# Patient Record
Sex: FEMALE | Race: WHITE | Employment: FULL TIME | URBAN - METROPOLITAN AREA
[De-identification: names, ages, dates, MRNs, and addresses within clinical notes are randomized per-mention and may not be internally consistent; named-entity substitution may affect disease eponyms.]

---

## 2017-12-28 ENCOUNTER — OFFICE VISIT (OUTPATIENT)
Dept: URGENT CARE | Facility: RETAIL CLINIC | Age: 32
End: 2017-12-28
Payer: COMMERCIAL

## 2017-12-28 VITALS — TEMPERATURE: 97.8 F | SYSTOLIC BLOOD PRESSURE: 128 MMHG | DIASTOLIC BLOOD PRESSURE: 73 MMHG | HEART RATE: 80 BPM

## 2017-12-28 DIAGNOSIS — R05.9 COUGH: ICD-10-CM

## 2017-12-28 DIAGNOSIS — K52.9 GASTROENTERITIS: ICD-10-CM

## 2017-12-28 DIAGNOSIS — H10.9 BACTERIAL CONJUNCTIVITIS: Primary | ICD-10-CM

## 2017-12-28 LAB
FLUAV AG UPPER RESP QL IA.RAPID: NORMAL
FLUBV AG UPPER RESP QL IA.RAPID: NORMAL

## 2017-12-28 PROCEDURE — 99213 OFFICE O/P EST LOW 20 MIN: CPT | Performed by: PHYSICIAN ASSISTANT

## 2017-12-28 PROCEDURE — 87804 INFLUENZA ASSAY W/OPTIC: CPT | Mod: 59 | Performed by: PHYSICIAN ASSISTANT

## 2017-12-28 RX ORDER — POLYMYXIN B SULFATE AND TRIMETHOPRIM 1; 10000 MG/ML; [USP'U]/ML
1 SOLUTION OPHTHALMIC
Qty: 1 BOTTLE | Refills: 0 | Status: SHIPPED | OUTPATIENT
Start: 2017-12-28 | End: 2018-01-04

## 2017-12-28 NOTE — NURSING NOTE
"Chief Complaint   Patient presents with     Cough     x 2 days      Vomiting       Initial /73  Pulse 80  Temp 97.8  F (36.6  C) (Oral) Estimated body mass index is 29.23 kg/(m^2) as calculated from the following:    Height as of 7/25/16: 5' 2\" (1.575 m).    Weight as of 12/28/16: 159 lb 12.8 oz (72.5 kg).  Medication Reconciliation: complete   Ashely Donahue      "

## 2017-12-28 NOTE — PROGRESS NOTES
SUBJECTIVE:   Mandy Montgomery is a 32 year old female who presents to clinic today for the following health issues:    Gastrointestinal symptoms      Duration: 2 days    Description:           Nausea with one episode of vomiting, concerned about influenza    Intensity:  mild    Accompanying signs and symptoms:  nausea, vomitting and denies diarrhea    History  Previous {similar problem: no   Previous evaluation:  none    Aggravating factors: none    Alleviating factors: nothing    Other Therapies tried: None        Eye(s) Problem      Duration: 1 day    Description:  Location: right  Pain: no   Redness: YES  Discharge: YES    Accompanying signs and symptoms: No other cold symptoms    History (Trauma, foreign body exposure,): None    Precipitating or alleviating factors (contact use): None    Therapies tried and outcome: None        Problem list and histories reviewed & adjusted, as indicated.  Additional history: as documented    Patient Active Problem List   Diagnosis     Mild major depression (H)     CARDIOVASCULAR SCREENING; LDL GOAL LESS THAN 160     GERD (gastroesophageal reflux disease)     Anxiety     Generalized anxiety disorder     Family history of colon cancer     Other normal pregnancy, not first, third trimester     Post-dates pregnancy     Vaginal delivery     Past Surgical History:   Procedure Laterality Date     COLONOSCOPY  1/09    REPEAT AGE 31 AND EVERY 5 YEARS     CYSTOSCOPY N/A 12/29/2016    Procedure: CYSTOSCOPY;  Surgeon: Eder Evans MD;  [facility]     LAPAROSCOPIC TUBAL LIGATION Bilateral 12/29/2016    Procedure: LAPAROSCOPIC TUBAL LIGATION;  Surgeon: Kamaljit Gil MD;  Location: [facility]       Social History   Substance Use Topics     Smoking status: Never Smoker     Smokeless tobacco: Never Used     Alcohol use 0.6 oz/week     1 Standard drinks or equivalent per week     Family History   Problem Relation Age of Onset     CEREBROVASCULAR DISEASE Father      BRAIN  ANEURYSM     Hypertension Maternal Grandmother      Hypertension Maternal Grandfather      Genitourinary Problems Father      polycystic kidney disease     Cancer - colorectal Mother 45         Current Outpatient Prescriptions   Medication Sig Dispense Refill     trimethoprim-polymyxin b (POLYTRIM) ophthalmic solution Apply 1 drop to eye every 3 hours for 7 days 1 Bottle 0     Prenatal Vit-Fe Fumarate-FA (PRENATAL MULTIVITAMIN  PLUS IRON) 27-0.8 MG TABS Take 1 tablet by mouth daily 100 tablet 3     HYDROcodone-acetaminophen (NORCO) 5-325 MG per tablet Take 1-2 tablets by mouth every 4 hours as needed for moderate to severe pain maximum 4 tablet(s) per day (Patient not taking: Reported on 12/28/2017) 20 tablet 0     ibuprofen (ADVIL,MOTRIN) 400 MG tablet Take 2 tablets (800 mg) by mouth every 6 hours as needed for other (cramping) (Patient not taking: Reported on 12/28/2017) 100 tablet 0     Allergies   Allergen Reactions     Azithromycin Rash     PT NOT SURE     BP Readings from Last 3 Encounters:   12/28/17 128/73   12/29/16 120/73   12/28/16 112/66    Wt Readings from Last 3 Encounters:   12/28/16 159 lb 12.8 oz (72.5 kg)   11/14/16 172 lb 12.8 oz (78.4 kg)   11/09/16 171 lb (77.6 kg)                        Reviewed and updated as needed this visit by clinical staffTobacco  Allergies  Meds       Reviewed and updated as needed this visit by Provider         ROS:  Constitutional, HEENT, cardiovascular, pulmonary, gi and gu systems are negative, except as otherwise noted.      OBJECTIVE:   /73  Pulse 80  Temp 97.8  F (36.6  C) (Oral)  There is no height or weight on file to calculate BMI.  GENERAL: healthy, alert and no distress  EYES: conjunctiva/corneas- conjunctival injection of right eye with white drainage  HENT: ear canals and TM's normal, nose and mouth without ulcers or lesions  NECK: no adenopathy, no asymmetry, masses, or scars and thyroid normal to palpation  RESP: lungs clear to auscultation -  no rales, rhonchi or wheezes  CV: regular rate and rhythm, normal S1 S2, no S3 or S4, no murmur, click or rub, no peripheral edema and peripheral pulses strong  ABDOMEN: soft, nontender, no hepatosplenomegaly, no masses and bowel sounds normal  MS: no gross musculoskeletal defects noted, no edema    Diagnostic Test Results:  Influenza neg    ASSESSMENT/PLAN:       ICD-10-CM    1. Bacterial conjunctivitis H10.9 trimethoprim-polymyxin b (POLYTRIM) ophthalmic solution   2. Cough R05 INFLUENZA A/B ANTIGEN   3. Gastroenteritis K52.9        As needed if not improving.  The patient indicates understanding of these issues and agrees with the plan.      Damaris Horn PA-C  [facility]

## 2017-12-28 NOTE — MR AVS SNAPSHOT
After Visit Summary   12/28/2017    Mandy Montgomery    MRN: [id number]           Patient Information     Date Of Birth          1985        Visit Information        Provider Department      12/28/2017 1:50 PM Damaris Horn PA-C [facility]        Today's Diagnoses     Cough    -  1    Bacterial conjunctivitis          Care Instructions      Conjunctivitis Caused by Infection     Wash hands often to help prevent spreading infection.     Infections are caused by viruses or germs (bacteria). Treatment includes keeping your eyes and hands clean. Your healthcare provider may prescribe eye drops, and tell you to stay home from work or school if you re contagious. Untreated infections can be serious. It's important to see your provider for a diagnosis.  Viral infections  A cold, flu, or other virus can spread to your eyes. This causes a watery discharge. Your eyes may burn or itch and get red. Your eyelids may also be puffy and sore.  Treatment  Most viral infections go away on their own. Artificial tears and warm compresses can relieve symptoms. Your provider may also prescribe eye drops. A viral infection can be very contagious and spreads quickly. To prevent this, wash your hands often. Use a separate tissue to wipe each eye. Don t touch your eyes or share bedding or towels.   Bacterial infections  Bacterial infections often occur in one eye. There may be a watery or a thick discharge from the eye. These infections can cause serious damage to your eye if not treated promptly.  Treatment  Your provider may prescribe eye drops or ointment to kill the bacteria. Warm compresses can help keep the eyelids clean. To keep the bacteria from spreading, wash your hands often. Use a separate tissue to wipe each eye. Don t touch your eyes or share bedding or towels.  Date Last Reviewed: 6/11/2015 2000-2017 [facility]. [address] [place] [address], [place] [address]. All  rights reserved. This information is not intended as a substitute for professional medical care. Always follow your healthcare professional's instructions.                Follow-ups after your visit        Who to contact     You can reach your care team any time of the day by calling [phone].  Notification of test results:  If you have an abnormal lab result, we will notify you by phone as soon as possible.         Additional Information About Your Visit        MyChart Information     [employer] gives you secure access to your electronic health record. If you see a primary care provider, you can also send messages to your care team and make appointments. If you have questions, please call your primary care clinic.  If you do not have a primary care provider, please call [phone] and they will assist you.        Care EveryWhere ID     This is your Care EveryWhere ID. This could be used by other organizations to access your [place] medical records  [id number]        Your Vitals Were     Pulse Temperature                80 97.8  F (36.6  C) (Oral)           Blood Pressure from Last 3 Encounters:   12/28/17 128/73   12/29/16 120/73   12/28/16 112/66    Weight from Last 3 Encounters:   12/28/16 159 lb 12.8 oz (72.5 kg)   11/14/16 172 lb 12.8 oz (78.4 kg)   11/09/16 171 lb (77.6 kg)              We Performed the Following     INFLUENZA A/B ANTIGEN          Today's Medication Changes          These changes are accurate as of: 12/28/17  2:07 PM.  If you have any questions, ask your nurse or doctor.               Start taking these medicines.        Dose/Directions    trimethoprim-polymyxin b ophthalmic solution   Commonly known as:  POLYTRIM   Used for:  Bacterial conjunctivitis   Started by:  Daamris Horn PA-C        Dose:  1 drop   Apply 1 drop to eye every 3 hours for 7 days   Quantity:  1 Bottle   Refills:  0            Where to get your medicines      These medications were sent to [facility]  [address] - [place], [place] - [address]  [address], [facility] [address]     Phone:  [phone]     trimethoprim-polymyxin b ophthalmic solution                Primary Care Provider Office Phone # Fax #    Kamaljit Gil [phone] [address] [facility]   [facility] [place] [address]        Equal Access to Services     [facility]: Hadii aad ku hadasho Soomaali, waaxda luqadaha, qaybta kaalmada adeegyada, waxay idiin hayaan adeanderson khjayyriley lakathy . So [facility] [phone].    ATENCIÓN: Si habla español, tiene a chamberlain disposición servicios gratuitos de asistencia lingüística. Daxame al [phone].    We comply with applicable federal civil rights laws and [place] laws. We do not discriminate on the basis of race, color, national origin, age, disability, sex, sexual orientation, or gender identity.            Thank you!     Thank you for choosing [facility]  for your care. Our goal is always to provide you with excellent care. Hearing back from our patients is one way we can continue to improve our services. Please take a few minutes to complete the written survey that you may receive in the mail after your visit with us. Thank you!             Your Updated Medication List - Protect others around you: Learn how to safely use, store and throw away your medicines at www.disposemymeds.org.          This list is accurate as of: 12/28/17  2:07 PM.  Always use your most recent med list.                   Brand Name Dispense Instructions for use Diagnosis    HYDROcodone-acetaminophen 5-325 MG per tablet    NORCO    20 tablet    Take 1-2 tablets by mouth every 4 hours as needed for moderate to severe pain maximum 4 tablet(s) per day    S/P tubal ligation       ibuprofen 400 MG tablet    ADVIL/MOTRIN    100 tablet    Take 2 tablets (800 mg) by mouth every 6 hours as needed for other (cramping)    Vaginal delivery       prenatal multivitamin plus iron 27-0.8 MG Tabs per tablet     100 tablet     Take 1 tablet by mouth daily    Need for vaccination, Other normal pregnancy, not first, second trimester       trimethoprim-polymyxin b ophthalmic solution    POLYTRIM    1 Bottle    Apply 1 drop to eye every 3 hours for 7 days    Bacterial conjunctivitis

## 2017-12-28 NOTE — PATIENT INSTRUCTIONS
Conjunctivitis Caused by Infection     Wash hands often to help prevent spreading infection.     Infections are caused by viruses or germs (bacteria). Treatment includes keeping your eyes and hands clean. Your healthcare provider may prescribe eye drops, and tell you to stay home from work or school if you re contagious. Untreated infections can be serious. It's important to see your provider for a diagnosis.  Viral infections  A cold, flu, or other virus can spread to your eyes. This causes a watery discharge. Your eyes may burn or itch and get red. Your eyelids may also be puffy and sore.  Treatment  Most viral infections go away on their own. Artificial tears and warm compresses can relieve symptoms. Your provider may also prescribe eye drops. A viral infection can be very contagious and spreads quickly. To prevent this, wash your hands often. Use a separate tissue to wipe each eye. Don t touch your eyes or share bedding or towels.   Bacterial infections  Bacterial infections often occur in one eye. There may be a watery or a thick discharge from the eye. These infections can cause serious damage to your eye if not treated promptly.  Treatment  Your provider may prescribe eye drops or ointment to kill the bacteria. Warm compresses can help keep the eyelids clean. To keep the bacteria from spreading, wash your hands often. Use a separate tissue to wipe each eye. Don t touch your eyes or share bedding or towels.  Date Last Reviewed: 6/11/2015 2000-2017 [employer]. [address] [place] [address], [place] [address]. All rights reserved. This information is not intended as a substitute for professional medical care. Always follow your healthcare professional's instructions.

## 2018-02-19 ENCOUNTER — OFFICE VISIT (OUTPATIENT)
Dept: FAMILY MEDICINE | Facility: CLINIC | Age: 33
End: 2018-02-19
Payer: OTHER MISCELLANEOUS

## 2018-02-19 VITALS
HEART RATE: 72 BPM | HEIGHT: 64 IN | WEIGHT: 148 LBS | OXYGEN SATURATION: 100 % | BODY MASS INDEX: 25.27 KG/M2 | DIASTOLIC BLOOD PRESSURE: 70 MMHG | TEMPERATURE: 98.7 F | SYSTOLIC BLOOD PRESSURE: 112 MMHG

## 2018-02-19 DIAGNOSIS — Z80.0 FAMILY HISTORY OF COLON CANCER: ICD-10-CM

## 2018-02-19 DIAGNOSIS — R10.31 RLQ ABDOMINAL PAIN: Primary | ICD-10-CM

## 2018-02-19 DIAGNOSIS — S39.012A STRAIN OF MUSCLE, FASCIA AND TENDON OF LOWER BACK, INITIAL ENCOUNTER: ICD-10-CM

## 2018-02-19 DIAGNOSIS — Z12.11 SPECIAL SCREENING FOR MALIGNANT NEOPLASMS, COLON: ICD-10-CM

## 2018-02-19 LAB
ALBUMIN UR-MCNC: NEGATIVE MG/DL
APPEARANCE UR: CLEAR
BILIRUB UR QL STRIP: NEGATIVE
COLOR UR AUTO: NORMAL
GLUCOSE UR STRIP-MCNC: NEGATIVE MG/DL
HGB UR QL STRIP: NEGATIVE
KETONES UR STRIP-MCNC: NEGATIVE MG/DL
LEUKOCYTE ESTERASE UR QL STRIP: NEGATIVE
NITRATE UR QL: NEGATIVE
PH UR STRIP: 7 PH (ref 5–7)
SOURCE: NORMAL
SP GR UR STRIP: 1.01 (ref 1–1.03)
UROBILINOGEN UR STRIP-MCNC: 0 MG/DL (ref 0–2)

## 2018-02-19 PROCEDURE — 99213 OFFICE O/P EST LOW 20 MIN: CPT | Performed by: FAMILY MEDICINE

## 2018-02-19 PROCEDURE — 81003 URINALYSIS AUTO W/O SCOPE: CPT | Performed by: FAMILY MEDICINE

## 2018-02-19 ASSESSMENT — ANXIETY QUESTIONNAIRES
GAD7 TOTAL SCORE: 19
6. BECOMING EASILY ANNOYED OR IRRITABLE: NEARLY EVERY DAY
2. NOT BEING ABLE TO STOP OR CONTROL WORRYING: NEARLY EVERY DAY
3. WORRYING TOO MUCH ABOUT DIFFERENT THINGS: NEARLY EVERY DAY
1. FEELING NERVOUS, ANXIOUS, OR ON EDGE: NEARLY EVERY DAY
IF YOU CHECKED OFF ANY PROBLEMS ON THIS QUESTIONNAIRE, HOW DIFFICULT HAVE THESE PROBLEMS MADE IT FOR YOU TO DO YOUR WORK, TAKE CARE OF THINGS AT HOME, OR GET ALONG WITH OTHER PEOPLE: VERY DIFFICULT
5. BEING SO RESTLESS THAT IT IS HARD TO SIT STILL: NEARLY EVERY DAY
7. FEELING AFRAID AS IF SOMETHING AWFUL MIGHT HAPPEN: NEARLY EVERY DAY

## 2018-02-19 ASSESSMENT — PATIENT HEALTH QUESTIONNAIRE - PHQ9: 5. POOR APPETITE OR OVEREATING: SEVERAL DAYS

## 2018-02-19 ASSESSMENT — PAIN SCALES - GENERAL: PAINLEVEL: SEVERE PAIN (6)

## 2018-02-19 NOTE — MR AVS SNAPSHOT
After Visit Summary   2/19/2018    Mandy Montgomery    MRN: [id number]           Patient Information     Date Of Birth          1985        Visit Information        Provider Department      2/19/2018 12:20 PM Basilio Reardon MD [facility]        Today's Diagnoses     RLQ abdominal pain    -  1    Strain of muscle, fascia and tendon of lower back, initial encounter        Family history of colon cancer        Special screening for malignant neoplasms, colon           Follow-ups after your visit        Additional Services     GASTROENTEROLOGY ADULT REF PROCEDURE ONLY [facility] [phone]; [facility] GI Provider       Last Lab Result: Creatinine (mg/dL)       Date                     Value                 10/11/2013               0.82             ----------  Body mass index is 25.81 kg/(m^2).     Needed:  No  Language:  English    Patient will be contacted to schedule procedure.     Please be aware that coverage of these services is subject to the terms and limitations of your health insurance plan.  Call member services at your health plan with any benefit or coverage questions.  Any procedures must be performed at a [place] facility OR coordinated by your clinic's referral office.    Please bring the following with you to your appointment:    (1) Any X-Rays, CTs or MRIs which have been performed.  Contact the facility where they were done to arrange for  prior to your scheduled appointment.    (2) List of current medications   (3) This referral request   (4) Any documents/labs given to you for this referral                  Your next 10 appointments already scheduled     Mar 13, 2018 10:40 AM CDT   PHYSICAL with Kamaljit Gil MD   [facility]    [address] [place] [id number] [phone]              Who to contact     If you have questions or need follow up information about today's  "clinic visit or your schedule please contact [facility] directly at [phone].  Normal or non-critical lab and imaging results will be communicated to you by MyChart, letter or phone within 4 business days after the clinic has received the results. If you do not hear from us within 7 days, please contact the clinic through [employer] or phone. If you have a critical or abnormal lab result, we will notify you by phone as soon as possible.  Submit refill requests through [employer] or call your pharmacy and they will forward the refill request to us. Please allow 3 business days for your refill to be completed.          Additional Information About Your Visit        [employer] Information     [employer] gives you secure access to your electronic health record. If you see a primary care provider, you can also send messages to your care team and make appointments. If you have questions, please call your primary care clinic.  If you do not have a primary care provider, please call [phone] and they will assist you.        Care EveryWhere ID     This is your Care EveryWhere ID. This could be used by other organizations to access your [place] medical records  [id number]        Your Vitals Were     Pulse Temperature Height Pulse Oximetry BMI (Body Mass Index)       72 98.7  F (37.1  C) (Tympanic) 5' 3.5\" (1.613 m) 100% 25.81 kg/m2        Blood Pressure from Last 3 Encounters:   02/19/18 112/70   12/28/17 128/73   12/29/16 120/73    Weight from Last 3 Encounters:   02/19/18 148 lb (67.1 kg)   12/28/16 159 lb 12.8 oz (72.5 kg)   11/14/16 172 lb 12.8 oz (78.4 kg)              We Performed the Following     *UA reflex to Microscopic and Culture ([place]; [facility]; [facility]; [facility]; [facility]; [facility]; [place]; [place])     GASTROENTEROLOGY ADULT REF PROCEDURE ONLY [facility] [phone]; [facility] GI Provider        Primary Care Provider Office Phone # Fax #    Kamaljit RANGEL " MD [place] [phone]       [address] [place] [address]        Equal Access to Services     CONNIE LAM : Hadii aad ku haddaltondara Chavez, melodyserge jollykattyha, solismatias dwyertomerserge steelemerylserge, reece pabloin hayaacielo easonanderson lazo laVirajdavid julio. So [facility] [phone].    ATENCIÓN: Si habla español, tiene a chamberlain disposición servicios gratuitos de asistencia lingüística. Llame al [phone].    We comply with applicable federal civil rights laws and [place] laws. We do not discriminate on the basis of race, color, national origin, age, disability, sex, sexual orientation, or gender identity.            Thank you!     Thank you for choosing [facility]  for your care. Our goal is always to provide you with excellent care. Hearing back from our patients is one way we can continue to improve our services. Please take a few minutes to complete the written survey that you may receive in the mail after your visit with us. Thank you!             Your Updated Medication List - Protect others around you: Learn how to safely use, store and throw away your medicines at www.disposemymeds.org.      Notice  As of 2/19/2018  4:18 PM    You have not been prescribed any medications.

## 2018-02-19 NOTE — PROGRESS NOTES
This patient was a no show for this scheduled appointment.      SUBJECTIVE:   Mandy Montgomery is a 32 year old female who presents to clinic today for the following health issues:      ABDOMINAL PAIN     Onset: 5 days now    Description:   Character: Sharp and Stabbing  Location: right lower quadrant  Radiation: Back    Intensity: moderate    Progression of Symptoms:  same    Accompanying Signs & Symptoms:  Fever/Chills?: no   Gas/Bloating: yes  Nausea: no   Vomitting: no   Diarrhea?: no   Constipation:no   Dysuria or Hematuria: no    History:   Trauma: no   Previous similar pain: no    Previous tests done: none    Precipitating factors:   Does the pain change with:     Food: no      BM: no     Urination: YES    Alleviating factors:    Therapies Tried and outcome:     LMP:  2018       Problem list and histories reviewed & adjusted, as indicated.  Additional history: her mother  of colon cancer at age 45.  She has not had any screening to date.      Patient Active Problem List   Diagnosis     Mild major depression (H)     CARDIOVASCULAR SCREENING; LDL GOAL LESS THAN 160     GERD (gastroesophageal reflux disease)     Family history of colon cancer     Past Surgical History:   Procedure Laterality Date     COLONOSCOPY      REPEAT AGE 31 AND EVERY 5 YEARS     CYSTOSCOPY N/A 2016    Procedure: CYSTOSCOPY;  Surgeon: Eder Evans MD;  [facility]     LAPAROSCOPIC TUBAL LIGATION Bilateral 2016    Procedure: LAPAROSCOPIC TUBAL LIGATION;  Surgeon: Kamaljit Gil MD;  Location: [facility]       Social History   Substance Use Topics     Smoking status: Never Smoker     Smokeless tobacco: Never Used     Alcohol use 4.2 oz/week     7 Glasses of wine per week     Family History   Problem Relation Age of Onset     CEREBROVASCULAR DISEASE Father      BRAIN ANEURYSM in his early 30s     Genitourinary Problems Father      polycystic kidney disease     Other - See Comments Father 51       "in a motorcycle accident     Hypertension Maternal Grandmother      Hypertension Maternal Grandfather      Cancer - colorectal Mother 45     Other - See Comments Sister      older     Post-Traumatic Stress Disorder (PTSD) Sister      Other - See Comments Brother 20      in an MVA         Allergies   Allergen Reactions     Azithromycin Rash     PT NOT SURE     BP Readings from Last 3 Encounters:   18 124/66   18 112/70   17 128/73    Wt Readings from Last 3 Encounters:   18 146 lb 2 oz (66.3 kg)   18 148 lb (67.1 kg)   16 159 lb 12.8 oz (72.5 kg)                    Reviewed and updated as needed this visit by clinical staff  Tobacco  Allergies  Meds       Reviewed and updated as needed this visit by Provider         ROS:  Constitutional, HEENT, cardiovascular, pulmonary, gi and gu systems are negative, except as otherwise noted.    OBJECTIVE:     /70  Pulse 72  Temp 98.7  F (37.1  C) (Tympanic)  Ht 5' 3.5\" (1.613 m)  Wt 148 lb (67.1 kg)  SpO2 100%  BMI 25.81 kg/m2  Body mass index is 25.81 kg/(m^2).  GENERAL: healthy, alert and no distress  RESP: lungs clear to auscultation - no rales, rhonchi or wheezes  CV: regular rate and rhythm, normal S1 S2, no S3 or S4, no murmur, click or rub, no peripheral edema and peripheral pulses strong  ABDOMEN: soft, nontender except for some minimal tenderness in the suprapubic region just right of midline, no hepatosplenomegaly, no masses and bowel sounds normal  MS: no gross musculoskeletal defects noted, no edema  NEURO: Normal strength and tone, mentation intact and speech normal  BACK:  She has some mild tenderness to palpation over the paraspinal musculature in the low back region.  No muscle spasms noted.      Diagnostic Test Results:  UA was negative.     ASSESSMENT/PLAN:   (R10.31) RLQ abdominal pain  (primary encounter diagnosis)  Comment: no significant findings on exam and her urine was negative.   Plan: *UA reflex to " Microscopic and Culture         ([place]; [facility]; [facility];         [facility]; [facility]; [facility] [place];         [place]; [place])        Watch and wait.     (S39.012A) Strain of muscle, fascia and tendon of lower back, initial encounter  Comment: her back seems to be the bigger issue today.   Plan: reassured her that this is most likely soft tissue in nature.  Stretching and anti-inflammatories can be beneficial.     (Z80.0) Family history of colon cancer  (Z12.11) Special screening for malignant neoplasms, colon  Comment: her mother was diagnosed in her early 40s and  at age 45 from colon cancer.   Plan: GASTROENTEROLOGY ADULT REF PROCEDURE ONLY         [facility] [phone]; [facility] GI        Provider        She needs a screening colonoscopy now so will make that referral.       Electronically signed by:  Basilio Reardon M.D.  2018

## 2018-02-19 NOTE — NURSING NOTE
"Chief Complaint   Patient presents with     Abdominal Pain     RLQ Pain for about 5 days now       Initial Pulse 72  Temp 98.7  F (37.1  C) (Tympanic)  Ht 5' 3.5\" (1.613 m)  Wt 148 lb (67.1 kg)  SpO2 100%  BMI 25.81 kg/m2 Estimated body mass index is 25.81 kg/(m^2) as calculated from the following:    Height as of this encounter: 5' 3.5\" (1.613 m).    Weight as of this encounter: 148 lb (67.1 kg).  Medication Reconciliation: complete    "

## 2018-02-20 ENCOUNTER — TELEPHONE (OUTPATIENT)
Dept: FAMILY MEDICINE | Facility: CLINIC | Age: 33
End: 2018-02-20

## 2018-02-20 ASSESSMENT — PATIENT HEALTH QUESTIONNAIRE - PHQ9: SUM OF ALL RESPONSES TO PHQ QUESTIONS 1-9: 12

## 2018-02-20 ASSESSMENT — ANXIETY QUESTIONNAIRES: GAD7 TOTAL SCORE: 19

## 2018-02-20 NOTE — LETTER
Dear Mandy,    At [facility] we care about your health and are committed to providing quality patient care, which includes staying current on preventive cancer screenings.  You can increase your chances of finding and treating cancers through regular screenings.     Our records indicate you may be due for the following preventive screening(s):    Colonoscopy    Colonoscopy is recommended every ten years for everyone age 50 and older. We strongly urge our patient's to consider having a colonoscopy done, which is the best screening test available and only needs to be done every 10 years if results are normal. If you are unwilling or unable to have a colonoscopy then we recommend the annual stool testing for blood. This test is called a FIT test and it looks for blood in the stool.       To schedule your colonoscopy, you may contact us by phone at [phone]    If you have had any of the screenings listed above at another facility, please call us so that we may update your chart.          Your [facility] Team

## 2018-02-20 NOTE — TELEPHONE ENCOUNTER
Left message for patient to return call to schedule colonoscopy or EGD. If Selene or Marilu are unavailable, please transfer to the surgery center.     OK to schedule with Gail

## 2018-02-20 NOTE — LETTER
[address] [address]  [phone]        February 28, 2018    Mandy Montgomery  [address]  [place] [address]

## 2018-02-26 NOTE — TELEPHONE ENCOUNTER
Left message for patient to return call to schedule EGD/colonoscopy. If Marilu or Selene are not available, please transfer to same day surgery

## 2018-03-13 ENCOUNTER — OFFICE VISIT (OUTPATIENT)
Dept: BEHAVIORAL HEALTH | Facility: CLINIC | Age: 33
End: 2018-03-13
Payer: COMMERCIAL

## 2018-03-13 ENCOUNTER — OFFICE VISIT (OUTPATIENT)
Dept: FAMILY MEDICINE | Facility: CLINIC | Age: 33
End: 2018-03-13
Payer: COMMERCIAL

## 2018-03-13 VITALS
HEART RATE: 79 BPM | TEMPERATURE: 97 F | SYSTOLIC BLOOD PRESSURE: 124 MMHG | OXYGEN SATURATION: 99 % | RESPIRATION RATE: 12 BRPM | HEIGHT: 63 IN | BODY MASS INDEX: 25.89 KG/M2 | WEIGHT: 146.13 LBS | DIASTOLIC BLOOD PRESSURE: 66 MMHG

## 2018-03-13 DIAGNOSIS — F32.0 MILD MAJOR DEPRESSION (H): ICD-10-CM

## 2018-03-13 DIAGNOSIS — F41.9 ANXIETY: Primary | ICD-10-CM

## 2018-03-13 DIAGNOSIS — Z00.00 ROUTINE GENERAL MEDICAL EXAMINATION AT A HEALTH CARE FACILITY: Primary | ICD-10-CM

## 2018-03-13 PROCEDURE — 99395 PREV VISIT EST AGE 18-39: CPT | Performed by: FAMILY MEDICINE

## 2018-03-13 PROCEDURE — 99207 ZZC NO CHARGE BEHAVIORAL WARM HANDOFF: CPT | Performed by: MARRIAGE & FAMILY THERAPIST

## 2018-03-13 ASSESSMENT — PAIN SCALES - GENERAL: PAINLEVEL: NO PAIN (0)

## 2018-03-13 NOTE — PROGRESS NOTES
SUBJECTIVE:   CC: Mandy Montgomery is an 32 year old woman who presents for preventive health visit.     Physical   Annual:     Getting at least 3 servings of Calcium per day::  Yes    Bi-annual eye exam::  Yes    Dental care twice a year::  Yes    Sleep apnea or symptoms of sleep apnea::  Daytime drowsiness    Diet::  Regular (no restrictions)    Frequency of exercise::  1 day/week    Duration of exercise::  15-30 minutes    Taking medications regularly::  Yes    Medication side effects::  None    Additional concerns today::  YES                She gets pains in her sides (trunk) at times and in her back. This is intermittent and she is wondering if its her kidneys.   She also has been getting lightheaded that happens almost daily. Unsure if sugar level, bp??    Today's PHQ-2 Score:   PHQ-2 ( 1999 [employer]) 3/13/2018   Q1: Little interest or pleasure in doing things 1   Q2: Feeling down, depressed or hopeless 1   PHQ-2 Score 2   Q1: Little interest or pleasure in doing things Several days   Q2: Feeling down, depressed or hopeless Several days   PHQ-2 Score 2       Abuse: Current or Past(Physical, Sexual or Emotional)- No  Do you feel safe in your environment - Yes    Social History   Substance Use Topics     Smoking status: Never Smoker     Smokeless tobacco: Never Used     Alcohol use 0.6 oz/week     1 Standard drinks or equivalent per week     No flowsheet data found.    Reviewed orders with patient.  Reviewed health maintenance and updated orders accordingly - No  BP Readings from Last 3 Encounters:   03/13/18 124/66   02/19/18 112/70   12/28/17 128/73    Wt Readings from Last 3 Encounters:   03/13/18 146 lb 2 oz (66.3 kg)   02/19/18 148 lb (67.1 kg)   12/28/16 159 lb 12.8 oz (72.5 kg)                  No current outpatient prescriptions on file.       Mammogram not appropriate for this patient based on age.    Pertinent mammograms are reviewed under the imaging tab.  History of abnormal Pap smear: NO - age 30-  "66 PAP every 3 years recommended    Reviewed and updated as needed this visit by clinical staff         Reviewed and updated as needed this visit by Provider            Review of Systems  C: NEGATIVE for fever, chills, change in weight  I: NEGATIVE for worrisome rashes, moles or lesions  E: NEGATIVE for vision changes or irritation  ENT: NEGATIVE for ear, mouth and throat problems  R: NEGATIVE for significant cough or SOB  CV: NEGATIVE for chest pain, palpitations or peripheral edema  GI: NEGATIVE for nausea, abdominal pain, heartburn, or change in bowel habits  : NEGATIVE for unusual urinary or vaginal symptoms. Periods are regular.  M: NEGATIVE for significant arthralgias or myalgia  N: NEGATIVE for weakness, dizziness or paresthesias  P: NEGATIVE for changes in mood or affect     OBJECTIVE:   /66  Pulse 79  Temp 97  F (36.1  C) (Tympanic)  Resp 12  Ht 5' 2.6\" (1.59 m)  Wt 146 lb 2 oz (66.3 kg)  SpO2 99%  BMI 26.22 kg/m2  Physical Exam  GENERAL: healthy, alert and no distress  NECK: no adenopathy, no asymmetry, masses, or scars and thyroid normal to palpation  RESP: lungs clear to auscultation - no rales, rhonchi or wheezes  CV: regular rate and rhythm, normal S1 S2, no S3 or S4, no murmur, click or rub, no peripheral edema and peripheral pulses strong  ABDOMEN: soft, nontender, no hepatosplenomegaly, no masses and bowel sounds normal  MS: no gross musculoskeletal defects noted, no edema    ASSESSMENT/PLAN:   1. Routine general medical examination at a health care facility      2. Mild major depression (H)  stable   - DEPRESSION ACTION PLAN (DAP)    COUNSELING:  Reviewed preventive health counseling, as reflected in patient instructions       Regular exercise       Healthy diet/nutrition         reports that she has never smoked. She has never used smokeless tobacco.    Estimated body mass index is 26.22 kg/(m^2) as calculated from the following:    Height as of this encounter: 5' 2.6\" (1.59 m).    " Weight as of this encounter: 146 lb 2 oz (66.3 kg).   Weight management plan: Discussed healthy diet and exercise guidelines and patient will follow up in 12 months in clinic to re-evaluate.    Counseling Resources:  ATP IV Guidelines  Pooled Cohorts Equation Calculator  Breast Cancer Risk Calculator  FRAX Risk Assessment  ICSI Preventive Guidelines  Dietary Guidelines for Americans, 2010  USDA's MyPlate  ASA Prophylaxis  Lung CA Screening    Kamaljit Gil MD  [facility]  Answers for HPI/ROS submitted by the patient on 3/13/2018   PHQ-2 Score: 2

## 2018-03-13 NOTE — LETTER
My Depression Action Plan  Name: Mandy Montgomery   Date of Birth 1985  Date: 3/13/2018    My doctor: Kamaljit Gil   My clinic: [address] [address] [phone]          GREEN    ZONE   Good Control    What it looks like:     Things are going generally well. You have normal up s and down s. You may even feel depressed from time to time, but bad moods usually last less than a day.   What you need to do:  1. Continue to care for yourself (see self care plan)  2. Check your depression survival kit and update it as needed  3. Follow your physician s recommendations including any medication.  4. Do not stop taking medication unless you consult with your physician first.           YELLOW         ZONE Getting Worse    What it looks like:     Depression is starting to interfere with your life.     It may be hard to get out of bed; you may be starting to isolate yourself from others.    Symptoms of depression are starting to last most all day and this has happened for several days.     You may have suicidal thoughts but they are not constant.   What you need to do:     1. Call your care team, your response to treatment will improve if you keep your care team informed of your progress. Yellow periods are signs an adjustment may need to be made.     2. Continue your self-care, even if you have to fake it!    3. Talk to someone in your support network    4. Open up your depression survival kit           RED    ZONE Medical Alert - Get Help    What it looks like:     Depression is seriously interfering with your life.     You may experience these or other symptoms: You can t get out of bed most days, can t work or engage in other necessary activities, you have trouble taking care of basic hygiene, or basic responsibilities, thoughts of suicide or death that will not go away, self-injurious behavior.     What you need to do:  1. Call your care team and  request a same-day appointment. If they are not available (weekends or after hours) call your local crisis line, emergency room or 911.      Electronically signed by: Lorenza Menjivar, March 13, 2018    Depression Self Care Plan / Survival Kit    Self-Care for Depression  Here s the deal. Your body and mind are really not as separate as most people think.  What you do and think affects how you feel and how you feel influences what you do and think. This means if you do things that people who feel good do, it will help you feel better.  Sometimes this is all it takes.  There is also a place for medication and therapy depending on how severe your depression is, so be sure to consult with your medical provider and/ or Behavioral Health Consultant if your symptoms are worsening or not improving.     In order to better manage my stress, I will:    Exercise  Get some form of exercise, every day. This will help reduce pain and release endorphins, the  feel good  chemicals in your brain. This is almost as good as taking antidepressants!  This is not the same as joining a gym and then never going! (they count on that by the way ) It can be as simple as just going for a walk or doing some gardening, anything that will get you moving.      Hygiene   Maintain good hygiene (Get out of bed in the morning, Make your bed, Brush your teeth, Take a shower, and Get dressed like you were going to work, even if you are unemployed).  If your clothes don't fit try to get ones that do.    Diet  I will strive to eat foods that are good for me, drink plenty of water, and avoid excessive sugar, caffeine, alcohol, and other mood-altering substances.  Some foods that are helpful in depression are: complex carbohydrates, B vitamins, flaxseed, fish or fish oil, fresh fruits and vegetables.    Psychotherapy  I agree to participate in Individual Therapy (if recommended).    Medication  If prescribed medications, I agree to take them.  Missing doses can  result in serious side effects.  I understand that drinking alcohol, or other illicit drug use, may cause potential side effects.  I will not stop my medication abruptly without first discussing it with my provider.    Staying Connected With Others  I will stay in touch with my friends, family members, and my primary care provider/team.    Use your imagination  Be creative.  We all have a creative side; it doesn t matter if it s oil painting, sand castles, or mud pies! This will also kick up the endorphins.    Witness Beauty  (AKA stop and smell the roses) Take a look outside, even in mid-winter. Notice colors, textures. Watch the squirrels and birds.     Service to others  Be of service to others.  There is always someone else in need.  By helping others we can  get out of ourselves  and remember the really important things.  This also provides opportunities for practicing all the other parts of the program.    Humor  Laugh and be silly!  Adjust your TV habits for less news and crime-drama and more comedy.    Control your stress  Try breathing deep, massage therapy, biofeedback, and meditation. Find time to relax each day.     My support system    Clinic Contact:  Phone number:    Contact 1:  Phone number:    Contact 2:  Phone number:    Buddhist/:  Phone number:    Therapist:  Phone number:    Local crisis center:    Phone number:    Other community support:  Phone number:

## 2018-03-13 NOTE — PROGRESS NOTES
Warm-handoff    C met with patient, by PCP request. C informed and explained integrated health model, use of brief therapy interventions, as well as referrals and support services for ongoing long-term therapy.  Patient reports that she experiences depression and anxiety and she believes that the anxiety is primary. She states that she has experienced the loss of both of her parents and one of her brothers. She states that her dad and brother's deaths were sudden, where her mom  of cancer when patient was 13. She reflected that she had more time to work through that. She states that she will worry about herself and her kids and she has increased irritability that impacts how she parents her four kids. Patient scheduled an initial visit with this writer for 3/21/18.

## 2018-03-13 NOTE — NURSING NOTE
"Chief Complaint   Patient presents with     Physical       Initial /66  Pulse 79  Temp 97  F (36.1  C) (Tympanic)  Resp 12  Ht 5' 2.6\" (1.59 m)  Wt 146 lb 2 oz (66.3 kg)  LMP 03/02/2018 (Exact Date)  SpO2 99%  Breastfeeding? No  BMI 26.22 kg/m2 Estimated body mass index is 26.22 kg/(m^2) as calculated from the following:    Height as of this encounter: 5' 2.6\" (1.59 m).    Weight as of this encounter: 146 lb 2 oz (66.3 kg).  Medication Reconciliation: complete   Health Maintenance Due   Topic Date Due     DEPRESSION ACTION PLAN Q1 YR  04/19/2017   'Lucia Menjivar [facility]      "

## 2018-03-13 NOTE — MR AVS SNAPSHOT
After Visit Summary   3/13/2018    Mandy Montgomery    MRN: [id number]           Patient Information     Date Of Birth          1985        Visit Information        Provider Department      3/13/2018 1:06 PM Ashely Singh LMFT [facility]        Today's Diagnoses     Anxiety    -  1    Mild major depression (H)           Follow-ups after your visit        Your next 10 appointments already scheduled     Mar 21, 2018 11:30 AM CDT   New Visit with JAMES Mckenna   [facility]    [address] [place] [address] [phone]              Who to contact     If you have questions or need follow up information about today's clinic visit or your schedule please contact [facility] directly at [phone].  Normal or non-critical lab and imaging results will be communicated to you by [employer], letter or phone within 4 business days after the clinic has received the results. If you do not hear from us within 7 days, please contact the clinic through [employer] or phone. If you have a critical or abnormal lab result, we will notify you by phone as soon as possible.  Submit refill requests through [employer] or call your pharmacy and they will forward the refill request to us. Please allow 3 business days for your refill to be completed.          Additional Information About Your Visit        MyChart Information     [employer] gives you secure access to your electronic health record. If you see a primary care provider, you can also send messages to your care team and make appointments. If you have questions, please call your primary care clinic.  If you do not have a primary care provider, please call [phone] and they will assist you.        Care EveryWhere ID     This is your Care EveryWhere ID. This could be used by other organizations to access your [facility] medical records  [id number]        Your Vitals Were      Last Period                   03/02/2018 (Exact Date)            Blood Pressure from Last 3 Encounters:   03/13/18 124/66   02/19/18 112/70   12/28/17 128/73    Weight from Last 3 Encounters:   03/13/18 66.3 kg (146 lb 2 oz)   02/19/18 67.1 kg (148 lb)   12/28/16 72.5 kg (159 lb 12.8 oz)              Today, you had the following     No orders found for display       Primary Care Provider Office Phone # Fax #    Kamaljit Gil [phone] [address] [place] [address]        Equal Access to Services     [facility]: Hadii maura germain hadyosef Soelio, waaxda meng, qaybta kaalmada priti, reece murillo . So [facility] [phone].    ATENCIÓN: Si habla español, tiene a chamberlain disposición servicios gratuitos de asistencia lingüística. Llame al [phone].    We comply with applicable federal civil rights laws and [place] laws. We do not discriminate on the basis of race, color, national origin, age, disability, sex, sexual orientation, or gender identity.            Thank you!     Thank you for choosing [facility]  for your care. Our goal is always to provide you with excellent care. Hearing back from our patients is one way we can continue to improve our services. Please take a few minutes to complete the written survey that you may receive in the mail after your visit with us. Thank you!             Your Updated Medication List - Protect others around you: Learn how to safely use, store and throw away your medicines at www.disposemymeds.org.      Notice  As of 3/13/2018  1:09 PM    You have not been prescribed any medications.

## 2018-03-13 NOTE — MR AVS SNAPSHOT
After Visit Summary   3/13/2018    Mandy Montgomery    MRN: [id number]           Patient Information     Date Of Birth          1985        Visit Information        Provider Department      3/13/2018 10:40 AM Kamaljit Gil MD [facility]        Today's Diagnoses     Routine general medical examination at a health care facility    -  1    Mild major depression (H)          Care Instructions      Preventive Health Recommendations  Female Ages 26 - 39  Yearly exam:   See your health care provider every year in order to    Review health changes.     Discuss preventive care.      Review your medicines if you your doctor has prescribed any.    Until age 30: Get a Pap test every three years (more often if you have had an abnormal result).    After age 30: Talk to your doctor about whether you should have a Pap test every 3 years or have a Pap test with HPV screening every 5 years.   You do not need a Pap test if your uterus was removed (hysterectomy) and you have not had cancer.  You should be tested each year for STDs (sexually transmitted diseases), if you're at risk.   Talk to your provider about how often to have your cholesterol checked.  If you are at risk for diabetes, you should have a diabetes test (fasting glucose).  Shots: Get a flu shot each year. Get a tetanus shot every 10 years.   Nutrition:     Eat at least 5 servings of fruits and vegetables each day.    Eat whole-grain bread, whole-wheat pasta and brown rice instead of white grains and rice.    Talk to your provider about Calcium and Vitamin D.     Lifestyle    Exercise at least 150 minutes a week (30 minutes a day, 5 days of the week). This will help you control your weight and prevent disease.    Limit alcohol to one drink per day.    No smoking.     Wear sunscreen to prevent skin cancer.    See your dentist every six months for an exam and cleaning.            Follow-ups after your visit        Your next 10  "appointments already scheduled     Mar 21, 2018 11:30 AM CDT   New Visit with JAMES Mckenna   [facility]    [address] [place] [address] [phone]              Who to contact     If you have questions or need follow up information about today's clinic visit or your schedule please contact [facility] directly at [phone].  Normal or non-critical lab and imaging results will be communicated to you by [employer], letter or phone within 4 business days after the clinic has received the results. If you do not hear from us within 7 days, please contact the clinic through [employer] or phone. If you have a critical or abnormal lab result, we will notify you by phone as soon as possible.  Submit refill requests through [employer] or call your pharmacy and they will forward the refill request to us. Please allow 3 business days for your refill to be completed.          Additional Information About Your Visit        [employer] Information     [employer] gives you secure access to your electronic health record. If you see a primary care provider, you can also send messages to your care team and make appointments. If you have questions, please call your primary care clinic.  If you do not have a primary care provider, please call [phone] and they will assist you.        Care EveryWhere ID     This is your Care EveryWhere ID. This could be used by other organizations to access your [facility] medical records  [id number]        Your Vitals Were     Pulse Temperature Respirations Height Last Period Pulse Oximetry    79 97  F (36.1  C) (Tympanic) 12 5' 2.6\" (1.59 m) 03/02/2018 (Exact Date) 99%    Breastfeeding? BMI (Body Mass Index)                No 26.22 kg/m2           Blood Pressure from Last 3 Encounters:   03/13/18 124/66   02/19/18 112/70   12/28/17 128/73    Weight from Last 3 Encounters:   03/13/18 146 lb 2 oz (66.3 kg)   02/19/18 148 lb " (67.1 kg)   12/28/16 159 lb 12.8 oz (72.5 kg)              We Performed the Following     DEPRESSION ACTION PLAN (DAP)        Primary Care Provider Office Phone # Fax #    Kamaljit Gil [phone] [address] [place] [address]        Equal Access to Services     [facility] : Hadii aad ku hadasho Soomaali, waaxda luqadaha, qaybta kaalmada adeegyada, waxay idiin hayaan adeanderson kharash laVirajaan . So [facility] [phone].    ATENCIÓN: Si habla español, tiene a chamberlain disposición servicios gratuitos de asistencia lingüística. Llame al [phone].    We comply with applicable federal civil rights laws and [place] laws. We do not discriminate on the basis of race, color, national origin, age, disability, sex, sexual orientation, or gender identity.            Thank you!     Thank you for choosing [facility]  for your care. Our goal is always to provide you with excellent care. Hearing back from our patients is one way we can continue to improve our services. Please take a few minutes to complete the written survey that you may receive in the mail after your visit with us. Thank you!             Your Updated Medication List - Protect others around you: Learn how to safely use, store and throw away your medicines at www.disposemymeds.org.      Notice  As of 3/13/2018 12:31 PM    You have not been prescribed any medications.

## 2018-05-05 ENCOUNTER — OFFICE VISIT (OUTPATIENT)
Dept: URGENT CARE | Facility: RETAIL CLINIC | Age: 33
End: 2018-05-05
Payer: COMMERCIAL

## 2018-05-05 VITALS
OXYGEN SATURATION: 99 % | DIASTOLIC BLOOD PRESSURE: 75 MMHG | HEART RATE: 81 BPM | TEMPERATURE: 98.3 F | SYSTOLIC BLOOD PRESSURE: 123 MMHG

## 2018-05-05 DIAGNOSIS — J02.9 ACUTE PHARYNGITIS, UNSPECIFIED ETIOLOGY: Primary | ICD-10-CM

## 2018-05-05 DIAGNOSIS — J35.8 TONSILLITH: ICD-10-CM

## 2018-05-05 DIAGNOSIS — Z20.818 STREP THROAT EXPOSURE: ICD-10-CM

## 2018-05-05 PROCEDURE — 87880 STREP A ASSAY W/OPTIC: CPT | Mod: QW | Performed by: PHYSICIAN ASSISTANT

## 2018-05-05 PROCEDURE — 99213 OFFICE O/P EST LOW 20 MIN: CPT | Performed by: PHYSICIAN ASSISTANT

## 2018-05-05 PROCEDURE — 87081 CULTURE SCREEN ONLY: CPT | Performed by: PHYSICIAN ASSISTANT

## 2018-05-05 NOTE — PROGRESS NOTES
Chief Complaint   Patient presents with     Pharyngitis     s/t x 1 day, loss of appeite- son has strep         SUBJECTIVE:   Pt. presenting to [facility] -  with a chief complaint of noticed white spot left tonsil today. A little tired, sl ST. RO strep.   See CC.  Onset of symptoms today  Course of illness is same.    Severity mild  Current and Associated symptoms: sore throat  Treatment measures tried include None tried.  Predisposing factors include exposure to strep.  Last antibiotic none > 6 months    Pregnancy no - SP tubal   Smoker neg    ROS:  Afebrile   Energy level is a little <  ENT - denies ear pain, some nasal congestion this am  CP - no cough,SOB or chest pain   GI- - appetite normal. No nausea, vomiting or diarrhea.   No bowel or bladder changes   MSK - no joint pain or swelling   Skin: No rashes    Past Medical History:   Diagnosis Date     History of anxiety      Irregular menstrual cycle      Other seborrheic dermatitis      Pain in joint, site unspecified      Past Surgical History:   Procedure Laterality Date     COLONOSCOPY  1/09    REPEAT AGE 31 AND EVERY 5 YEARS     CYSTOSCOPY N/A 12/29/2016    Procedure: CYSTOSCOPY;  Surgeon: Eder Evans MD;  Location: [facility] OR     LAPAROSCOPIC TUBAL LIGATION Bilateral 12/29/2016    Procedure: LAPAROSCOPIC TUBAL LIGATION;  Surgeon: Kamaljit Gil MD;  Location: [facility] OR     Patient Active Problem List   Diagnosis     Mild major depression (H)     CARDIOVASCULAR SCREENING; LDL GOAL LESS THAN 160     GERD (gastroesophageal reflux disease)     Family history of colon cancer     No current outpatient prescriptions on file.     No current facility-administered medications for this visit.        OBJECTIVE:  /75  Pulse 81  Temp 98.3  F (36.8  C) (Temporal)  SpO2 99%    GENERAL APPEARANCE: cooperative, alert and no distress. Appears well hydrated.  EYES: conjunctiva clear  HENT: Rt ear canal  clear and TM normal   Lt ear canal  clear and TM normal   Nose no congestion. no discharge  Mouth without ulcers or lesions. Very mild erythema. Small white tonsillith left tonsillar crypt -no exudate. Tonsils 1/4 exudate.  NECK: supple, no palp ant nodes. No  posterior nodes.  RESP: lungs clear to auscultation - no rales, rhonchi or wheezes. Breathing easily.  CV: regular rates and rhythm  ABDOMEN:  soft, nontender, no HSM or masses and bowel sounds normal   SKIN: no suspicious lesions or rashes  no tenderness to palpate over  sinus areas.    Rapid strep neg    ASSESSMENT:     Acute pharyngitis, unspecified etiology  Strep throat exposure  Tonsillith        PLAN:  Symptomatic measures   Throat culture pending - will be notified of positive results only.  Discussed tonsillith -try gargles.  Salt water gargles  -throat lozenges or honey/lemon tea if soothing   saline nasal spray for  nasal congestion   Cool mist vaporizer.   Stay in clean air environment.  > rest.  > fluids.  Contagiousness and hygiene discussed.  Fever and pain  control measures discussed.   If unable to swallow or any breathing difficulty to go to ED AVS given and discussed:  Patient Instructions      * PHARYNGITIS (Sore Throat),REPORT PENDING    Pharyngitis (sore throat) is often due to a virus, but can also be caused by the  strep  bacteria. This is called  strep throat . Both viral and strep infection can cause throat pain that is worse when swallowing, aching all over with headache and fever. Both types of infections are contagious. They may be spread by coughing, kissing or touching others after touching your mouth or nose, so wash your hands often.  A test has been done to determine whether or not you have strep throat. If it is positive for strep infection you will usually need to take antibiotics. If the test is negative, you probably have a viral pharyngitis, and antibiotic treatment will not help you recover.  HOME CARE:      If your symptoms are severe, rest at home for  the first 2-3 days. If you are told that your test is positive for strep, you should be off work and school for the first two days of antibiotic treatment. After that, you will no longer be as contagious.    Children: Use acetaminophen (Tylenol) for fever, fussiness or discomfort. In infants over six months of age, you may use ibuprofen (Children's Motrin) instead of Tylenol. [NOTE: If your child has chronic liver or kidney disease or ever had a stomach ulcer or GI bleeding, talk with your doctor before using these medicines.]   (Aspirin should never be used in anyone under 18 years of age who is ill with a fever. It may cause severe liver damage.)  Adults: You may use acetaminophen (Tylenol) 650-1000 mg every 6 hours or ibuprofen (Motrin, Advil) 600 mg every 6-8 hours with food to control pain, if you are able to take these medicines. [NOTE: If you have chronic liver or kidney disease or ever had a stomach ulcer or GI bleeding, talk with your doctor before using these medicines.]    Throat lozenges or sprays (Chloraseptic and others), or gargling with warm salt water will reduce throat pain. Dissolve 1/2 teaspoon of salt in 1 glass of warm water. This is especially useful just before meals.     FOLLOW UP with your doctor as advised by our staff if you are not improving over the next week.  GET PROMPT MEDICAL ATTENTION  if any of the following occur:    Fever over 101 F (38.3 C) for more than three days    New or worsening ear pain, sinus pain or headache    Unable to swallow liquids or open your mouth wide due to throat pain    Trouble breathing    Muffled voice    New rash       3765-2294 [employer]. [address] [place] [address]. All rights reserved. This information is not intended as a substitute for professional medical care. Always follow your healthcare professional's instructions.  This information has been modified by your health care provider with permission from the  publisher.    .........  Throat culture pending - will be notified of positive results only.    Please FOLLOW UP at primary care clinic if not improving, new symptoms, worse or this does not resolve.  [facility]  [phone]     Patient is comfortable with this plan.  Electronically signed,  EULA Carrera, PAC

## 2018-05-05 NOTE — PATIENT INSTRUCTIONS
* PHARYNGITIS (Sore Throat),REPORT PENDING    Pharyngitis (sore throat) is often due to a virus, but can also be caused by the  strep  bacteria. This is called  strep throat . Both viral and strep infection can cause throat pain that is worse when swallowing, aching all over with headache and fever. Both types of infections are contagious. They may be spread by coughing, kissing or touching others after touching your mouth or nose, so wash your hands often.  A test has been done to determine whether or not you have strep throat. If it is positive for strep infection you will usually need to take antibiotics. If the test is negative, you probably have a viral pharyngitis, and antibiotic treatment will not help you recover.  HOME CARE:      If your symptoms are severe, rest at home for the first 2-3 days. If you are told that your test is positive for strep, you should be off work and school for the first two days of antibiotic treatment. After that, you will no longer be as contagious.    Children: Use acetaminophen (Tylenol) for fever, fussiness or discomfort. In infants over six months of age, you may use ibuprofen (Children's Motrin) instead of Tylenol. [NOTE: If your child has chronic liver or kidney disease or ever had a stomach ulcer or GI bleeding, talk with your doctor before using these medicines.]   (Aspirin should never be used in anyone under 18 years of age who is ill with a fever. It may cause severe liver damage.)  Adults: You may use acetaminophen (Tylenol) 650-1000 mg every 6 hours or ibuprofen (Motrin, Advil) 600 mg every 6-8 hours with food to control pain, if you are able to take these medicines. [NOTE: If you have chronic liver or kidney disease or ever had a stomach ulcer or GI bleeding, talk with your doctor before using these medicines.]    Throat lozenges or sprays (Chloraseptic and others), or gargling with warm salt water will reduce throat pain. Dissolve 1/2 teaspoon of salt in 1 glass  of warm water. This is especially useful just before meals.     FOLLOW UP with your doctor as advised by our staff if you are not improving over the next week.  GET PROMPT MEDICAL ATTENTION  if any of the following occur:    Fever over 101 F (38.3 C) for more than three days    New or worsening ear pain, sinus pain or headache    Unable to swallow liquids or open your mouth wide due to throat pain    Trouble breathing    Muffled voice    New rash       2236-5348 [employer]. [address] [place] [address]. All rights reserved. This information is not intended as a substitute for professional medical care. Always follow your healthcare professional's instructions.  This information has been modified by your health care provider with permission from the publisher.    .........  Throat culture pending - will be notified of positive results only.    Please FOLLOW UP at primary care clinic if not improving, new symptoms, worse or this does not resolve.  [facility]  [phone]

## 2018-05-05 NOTE — MR AVS SNAPSHOT
After Visit Summary   5/5/2018    Mandy Montgomery    MRN: [id number]           Patient Information     Date Of Birth          1985        Visit Information        Provider Department      5/5/2018 1:00 PM Melissa Carrera PA-C [facility]        Today's Diagnoses     Acute pharyngitis, unspecified etiology    -  1    Strep throat exposure          Care Instructions       * PHARYNGITIS (Sore Throat),REPORT PENDING    Pharyngitis (sore throat) is often due to a virus, but can also be caused by the  strep  bacteria. This is called  strep throat . Both viral and strep infection can cause throat pain that is worse when swallowing, aching all over with headache and fever. Both types of infections are contagious. They may be spread by coughing, kissing or touching others after touching your mouth or nose, so wash your hands often.  A test has been done to determine whether or not you have strep throat. If it is positive for strep infection you will usually need to take antibiotics. If the test is negative, you probably have a viral pharyngitis, and antibiotic treatment will not help you recover.  HOME CARE:      If your symptoms are severe, rest at home for the first 2-3 days. If you are told that your test is positive for strep, you should be off work and school for the first two days of antibiotic treatment. After that, you will no longer be as contagious.    Children: Use acetaminophen (Tylenol) for fever, fussiness or discomfort. In infants over six months of age, you may use ibuprofen (Children's Motrin) instead of Tylenol. [NOTE: If your child has chronic liver or kidney disease or ever had a stomach ulcer or GI bleeding, talk with your doctor before using these medicines.]   (Aspirin should never be used in anyone under 18 years of age who is ill with a fever. It may cause severe liver damage.)  Adults: You may use acetaminophen (Tylenol) 650-1000 mg every 6 hours or  ibuprofen (Motrin, Advil) 600 mg every 6-8 hours with food to control pain, if you are able to take these medicines. [NOTE: If you have chronic liver or kidney disease or ever had a stomach ulcer or GI bleeding, talk with your doctor before using these medicines.]    Throat lozenges or sprays (Chloraseptic and others), or gargling with warm salt water will reduce throat pain. Dissolve 1/2 teaspoon of salt in 1 glass of warm water. This is especially useful just before meals.     FOLLOW UP with your doctor as advised by our staff if you are not improving over the next week.  GET PROMPT MEDICAL ATTENTION  if any of the following occur:    Fever over 101 F (38.3 C) for more than three days    New or worsening ear pain, sinus pain or headache    Unable to swallow liquids or open your mouth wide due to throat pain    Trouble breathing    Muffled voice    New rash       0611-1527 [employer]. [address] [place] [address]. All rights reserved. This information is not intended as a substitute for professional medical care. Always follow your healthcare professional's instructions.  This information has been modified by your health care provider with permission from the publisher.    .........  Throat culture pending - will be notified of positive results only.    Please FOLLOW UP at primary care clinic if not improving, new symptoms, worse or this does not resolve.  [facility]  [phone]            Follow-ups after your visit        Who to contact     You can reach your care team any time of the day by calling [phone].  Notification of test results:  If you have an abnormal lab result, we will notify you by phone as soon as possible.         Additional Information About Your Visit        MyChart Information     [employer] gives you secure access to your electronic health record. If you see a primary care provider, you can also send messages to your care team and make  appointments. If you have questions, please call your primary care clinic.  If you do not have a primary care provider, please call [phone] and they will assist you.        Care EveryWhere ID     This is your Care EveryWhere ID. This could be used by other organizations to access your [facility] medical records  [id number]        Your Vitals Were     Pulse Temperature Pulse Oximetry             81 98.3  F (36.8  C) (Temporal) 99%          Blood Pressure from Last 3 Encounters:   05/05/18 123/75   03/13/18 124/66   02/19/18 112/70    Weight from Last 3 Encounters:   03/13/18 146 lb 2 oz (66.3 kg)   02/19/18 148 lb (67.1 kg)   12/28/16 159 lb 12.8 oz (72.5 kg)              We Performed the Following     BETA STREP GROUP A R/O CULTURE     RAPID STREP SCREEN        Primary Care Provider Office Phone # Fax #    Kamaljit Gil [phone]       [address] [place] [address]        Equal Access to Services     [facility] : Hadii aad ku hadasho Soomaali, waaxda luqadaha, qaybta kaalmada adeegyada, waxay idiin hayson cedric murillo . So [facility] [phone].    ATENCIÓN: Si habla español, tiene a chamberlain disposición servicios gratuitos de asistencia lingüística. Llame al [phone].    We comply with applicable federal civil rights laws and [place] laws. We do not discriminate on the basis of race, color, national origin, age, disability, sex, sexual orientation, or gender identity.            Thank you!     Thank you for choosing [facility]  for your care. Our goal is always to provide you with excellent care. Hearing back from our patients is one way we can continue to improve our services. Please take a few minutes to complete the written survey that you may receive in the mail after your visit with us. Thank you!             Your Updated Medication List - Protect others around you: Learn how to safely use, store and throw away your medicines at  www.disposemymeds.org.      Notice  As of 5/5/2018  1:14 PM    You have not been prescribed any medications.

## 2018-05-07 LAB
BACTERIA SPEC CULT: NORMAL
SPECIMEN SOURCE: NORMAL

## 2019-03-15 ENCOUNTER — TELEPHONE (OUTPATIENT)
Dept: FAMILY MEDICINE | Facility: CLINIC | Age: 34
End: 2019-03-15

## 2019-03-15 NOTE — TELEPHONE ENCOUNTER
Please see FP Complete message below.  Pt scheduled herself an appt thru Mychart want to make sure she is ok to wait to until 21st for appt.   Thanks.     Appointment For: Mandy Montgomery ([id number])   [facility]      3/21/2019    3:00 PM  20 mins.  Ponce Alvarez MD      FAMILY PRACTICE      Patient Comments:   Occasional abdominal pain, near pelvic area also have light headed occasionally

## 2019-03-18 NOTE — TELEPHONE ENCOUNTER
": 1985  PHONE #'s: [phone] (home) no messages (work)    PRESENTING PROBLEM:  C/O intermittent Pain above her R hip. Wondering if musculare in nature? Or if it is the cyst on the right Kidney? Said she had an US years ago and a tiny cyst showed up.     NURSING ASSESSMENT  Description:   As above.  I work at [employer] and do a lot of lifting and bending. 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 I am very active physically at work. Wondering if this is irritating my hip area?   Onset/duration:  2 weeks.   Precip. factors:  Etiology unknown.   Assoc. Sx:  sometimes lower back pain and lightheadedness.   Improves/worsens Sx:   Slight headache at times.\"  Not sure if associated with this or not. \"  Pain scale (1-10)   3/10 or 4/10  Sx specific meds:  PNV prn to feel better. \" I don't take it every day. \"   LMP/preg/breast feeding:   Hx of tubal Ligation \" I am NOT pg. \"  Last exam/Tx:   Has NOT been seen for this.     RECOMMENDED DISPOSITION:  OK to keep appt on Thursday, 3/21/19, at 3 PM. This is NOT a physical exam appt. Just to discuss your recent R  hip pain. She will have to schedule a seperate appt for that.   Will comply with recommendation: YES   If further questions/concerns or if Sx do not improve, worsen or new Sx develop, call your PCP or [facility] as soon as possible.    NOTES:  Disposition was determined by the first positive assessment question, therefore all previous assessment questions were negative.  Informed to check provider manual or call insurance company to assure coverage.    Guideline used: Abdominal Pain/ Adult  Telephone Triage Protocols for Nurses, Fifth Edition, Sariah Palmer RN    "

## 2019-03-18 NOTE — TELEPHONE ENCOUNTER
I do not see anywhere this patient was triaged. Patient is on schedule for Thursday please triage to make sure patient can wait that long to see a provider.  Thank you  Nuris Valerio MA

## 2019-03-21 ENCOUNTER — OFFICE VISIT (OUTPATIENT)
Dept: FAMILY MEDICINE | Facility: CLINIC | Age: 34
End: 2019-03-21
Payer: COMMERCIAL

## 2019-03-21 VITALS
TEMPERATURE: 98 F | BODY MASS INDEX: 28.35 KG/M2 | WEIGHT: 160 LBS | OXYGEN SATURATION: 98 % | DIASTOLIC BLOOD PRESSURE: 62 MMHG | RESPIRATION RATE: 12 BRPM | SYSTOLIC BLOOD PRESSURE: 108 MMHG | HEART RATE: 89 BPM | HEIGHT: 63 IN

## 2019-03-21 DIAGNOSIS — Z12.11 SCREENING FOR COLON CANCER: ICD-10-CM

## 2019-03-21 DIAGNOSIS — R42 VERTIGO: ICD-10-CM

## 2019-03-21 DIAGNOSIS — Z00.01 ENCOUNTER FOR GENERAL ADULT MEDICAL EXAMINATION WITH ABNORMAL FINDINGS: Primary | ICD-10-CM

## 2019-03-21 DIAGNOSIS — Z80.0 FAMILY HISTORY OF COLON CANCER IN MOTHER: ICD-10-CM

## 2019-03-21 DIAGNOSIS — R10.11 RUQ ABDOMINAL PAIN: ICD-10-CM

## 2019-03-21 DIAGNOSIS — S16.1XXA STRAIN OF NECK MUSCLE, INITIAL ENCOUNTER: ICD-10-CM

## 2019-03-21 DIAGNOSIS — B07.0 PLANTAR WART: ICD-10-CM

## 2019-03-21 LAB
ALBUMIN SERPL-MCNC: 4.5 G/DL (ref 3.4–5)
ALBUMIN UR-MCNC: NEGATIVE MG/DL
ALP SERPL-CCNC: 71 U/L (ref 40–150)
ALT SERPL W P-5'-P-CCNC: 19 U/L (ref 0–50)
ANION GAP SERPL CALCULATED.3IONS-SCNC: 6 MMOL/L (ref 3–14)
APPEARANCE UR: CLEAR
AST SERPL W P-5'-P-CCNC: 18 U/L (ref 0–45)
BASOPHILS # BLD AUTO: 0.1 10E9/L (ref 0–0.2)
BASOPHILS NFR BLD AUTO: 1.3 %
BILIRUB SERPL-MCNC: 0.3 MG/DL (ref 0.2–1.3)
BILIRUB UR QL STRIP: NEGATIVE
BUN SERPL-MCNC: 14 MG/DL (ref 7–30)
CALCIUM SERPL-MCNC: 8.8 MG/DL (ref 8.5–10.1)
CHLORIDE SERPL-SCNC: 106 MMOL/L (ref 94–109)
CO2 SERPL-SCNC: 29 MMOL/L (ref 20–32)
COLOR UR AUTO: YELLOW
CREAT SERPL-MCNC: 0.76 MG/DL (ref 0.52–1.04)
DIFFERENTIAL METHOD BLD: NORMAL
EOSINOPHIL NFR BLD AUTO: 5.5 %
ERYTHROCYTE [DISTWIDTH] IN BLOOD BY AUTOMATED COUNT: 13.1 % (ref 10–15)
GFR SERPL CREATININE-BSD FRML MDRD: >90 ML/MIN/{1.73_M2}
GLUCOSE SERPL-MCNC: 120 MG/DL (ref 70–99)
GLUCOSE UR STRIP-MCNC: NEGATIVE MG/DL
HCT VFR BLD AUTO: 43.7 % (ref 35–47)
HGB BLD-MCNC: 14.9 G/DL (ref 11.7–15.7)
HGB UR QL STRIP: ABNORMAL
IMM GRANULOCYTES # BLD: 0 10E9/L (ref 0–0.4)
IMM GRANULOCYTES NFR BLD: 0.4 %
KETONES UR STRIP-MCNC: NEGATIVE MG/DL
LEUKOCYTE ESTERASE UR QL STRIP: NEGATIVE
LYMPHOCYTES # BLD AUTO: 2.7 10E9/L (ref 0.8–5.3)
LYMPHOCYTES NFR BLD AUTO: 35.5 %
MCH RBC QN AUTO: 29.8 PG (ref 26.5–33)
MCHC RBC AUTO-ENTMCNC: 34.1 G/DL (ref 31.5–36.5)
MCV RBC AUTO: 87 FL (ref 78–100)
MONOCYTES # BLD AUTO: 0.4 10E9/L (ref 0–1.3)
MONOCYTES NFR BLD AUTO: 4.7 %
MUCOUS THREADS #/AREA URNS LPF: PRESENT /LPF
NEUTROPHILS # BLD AUTO: 4 10E9/L (ref 1.6–8.3)
NEUTROPHILS NFR BLD AUTO: 52.6 %
NITRATE UR QL: NEGATIVE
NRBC # BLD AUTO: 0 10*3/UL
NRBC BLD AUTO-RTO: 0 /100
PH UR STRIP: 6 PH (ref 5–7)
PLATELET # BLD AUTO: 244 10E9/L (ref 150–450)
POTASSIUM SERPL-SCNC: 4 MMOL/L (ref 3.4–5.3)
PROT SERPL-MCNC: 8.1 G/DL (ref 6.8–8.8)
RBC # BLD AUTO: 5 10E12/L (ref 3.8–5.2)
RBC #/AREA URNS AUTO: <1 /HPF (ref 0–2)
SODIUM SERPL-SCNC: 141 MMOL/L (ref 133–144)
SOURCE: ABNORMAL
SP GR UR STRIP: 1.02 (ref 1–1.03)
SQUAMOUS #/AREA URNS AUTO: <1 /HPF (ref 0–1)
UROBILINOGEN UR STRIP-MCNC: 0 MG/DL (ref 0–2)
WBC # BLD AUTO: 7.6 10E9/L (ref 4–11)
WBC #/AREA URNS AUTO: <1 /HPF (ref 0–5)

## 2019-03-21 PROCEDURE — 85025 COMPLETE CBC W/AUTO DIFF WBC: CPT | Performed by: FAMILY MEDICINE

## 2019-03-21 PROCEDURE — 99214 OFFICE O/P EST MOD 30 MIN: CPT | Mod: 25 | Performed by: FAMILY MEDICINE

## 2019-03-21 PROCEDURE — G0145 SCR C/V CYTO,THINLAYER,RESCR: HCPCS | Performed by: FAMILY MEDICINE

## 2019-03-21 PROCEDURE — 81001 URINALYSIS AUTO W/SCOPE: CPT | Performed by: FAMILY MEDICINE

## 2019-03-21 PROCEDURE — 80053 COMPREHEN METABOLIC PANEL: CPT | Performed by: FAMILY MEDICINE

## 2019-03-21 PROCEDURE — 36415 COLL VENOUS BLD VENIPUNCTURE: CPT | Performed by: FAMILY MEDICINE

## 2019-03-21 PROCEDURE — 87624 HPV HI-RISK TYP POOLED RSLT: CPT | Performed by: FAMILY MEDICINE

## 2019-03-21 PROCEDURE — 99395 PREV VISIT EST AGE 18-39: CPT | Performed by: FAMILY MEDICINE

## 2019-03-21 ASSESSMENT — ENCOUNTER SYMPTOMS
HEMATOCHEZIA: 0
COUGH: 1
MYALGIAS: 1
CONSTIPATION: 0
DYSURIA: 0
CHILLS: 1
PARESTHESIAS: 0
FEVER: 0
JOINT SWELLING: 1
ARTHRALGIAS: 1
NERVOUS/ANXIOUS: 1
HEARTBURN: 1
ABDOMINAL PAIN: 1
DIZZINESS: 1
EYE PAIN: 0
HEMATURIA: 0
NAUSEA: 0
BREAST MASS: 0
DIARRHEA: 1
SHORTNESS OF BREATH: 0
SORE THROAT: 0
WEAKNESS: 0
FREQUENCY: 1
HEADACHES: 0
PALPITATIONS: 0

## 2019-03-21 ASSESSMENT — MIFFLIN-ST. JEOR: SCORE: 1411.01

## 2019-03-21 NOTE — PROGRESS NOTES
SUBJECTIVE:   CC: Mandy Montgomery is an 33 year old woman who presents for preventive health visit.     Physical   Annual:     Getting at least 3 servings of Calcium per day:  Yes    Bi-annual eye exam:  Yes    Dental care twice a year:  Yes    Sleep apnea or symptoms of sleep apnea:  Daytime drowsiness    Diet:  Carbohydrate counting    Frequency of exercise:  1 day/week    Duration of exercise:  30-45 minutes    Taking medications regularly:  Yes    Medication side effects:  None    Additional concerns today:  No    PHQ-2 Total Score: 1          FLANK   PAIN     Onset: x couple months off and on    Description:   Character: Sharp and Dull ache  Location: right flank  Radiation: Back    Intensity: mild, moderate    Progression of Symptoms:  same and intermittent    Accompanying Signs & Symptoms:  Fever/Chills?: no   Gas/Bloating: no   Nausea: no   Vomitting: no   Diarrhea?: no   Constipation:no   Dysuria or Hematuria: no    History:   Trauma: no   Previous similar pain: YES- last year same side   Previous tests done: none    Precipitating factors:   Does the pain change with:     Food: no      BM: no     Urination: no     Alleviating factors:  none    Therapies Tried and outcome: increase fluid intake    LMP:  3/7/19     States that she has multiple other issues.  She is having some neck discomfort from was draining it.  She is also complaining of intermittent vertigo positionally related if she turns too quickly.  She has a plantar wart on her left foot.  She needs repeat check for screening for colon cancer as her mother had this early.  She was told to follow-up in 5 years and it is at that time now.  She has not had any change in her bowel movements.  She does have this abdominal discomfort as described above.    Today's PHQ-2 Score:   PHQ-2 ( 1999 [employer]) 3/21/2019   Q1: Little interest or pleasure in doing things 0   Q2: Feeling down, depressed or hopeless 1   PHQ-2 Score 1   Q1: Little interest or  pleasure in doing things Not at all   Q2: Feeling down, depressed or hopeless Several days   PHQ-2 Score 1       Abuse: Current or Past(Physical, Sexual or Emotional)- No  Do you feel safe in your environment? Yes    Social History     Tobacco Use     Smoking status: Never Smoker     Smokeless tobacco: Never Used   Substance Use Topics     Alcohol use: Yes     Alcohol/week: 4.2 oz     Types: 7 Glasses of wine per week     Alcohol Use 3/21/2019   If you drink alcohol do you typically have greater than 3 drinks per day OR greater than 7 drinks per week? No       Reviewed orders with patient.  Reviewed health maintenance and updated orders accordingly - Yes      Mammogram not appropriate for this patient based on age.    Pertinent mammograms are reviewed under the imaging tab.  History of abnormal Pap smear: NO - age 30- 65 PAP every 3 years recommended  PAP / HPV 4/19/2016 2/9/2011 6/26/2009   PAP NIL NIL NIL     Reviewed and updated as needed this visit by clinical staff  Tobacco  Allergies  Meds  Med Hx  Surg Hx  Fam Hx  Soc Hx        Reviewed and updated as needed this visit by Provider        Past Medical History:   Diagnosis Date     History of anxiety      Irregular menstrual cycle      Other seborrheic dermatitis      Pain in joint, site unspecified       Past Surgical History:   Procedure Laterality Date     COLONOSCOPY  1/09    REPEAT AGE 31 AND EVERY 5 YEARS     CYSTOSCOPY N/A 12/29/2016    Procedure: CYSTOSCOPY;  Surgeon: Eder Evans MD;  Location:  OR     LAPAROSCOPIC TUBAL LIGATION Bilateral 12/29/2016    Procedure: LAPAROSCOPIC TUBAL LIGATION;  Surgeon: Kamaljit Gil MD;  Location:  OR       Review of Systems   Constitutional: Positive for chills. Negative for fever.   HENT: Positive for congestion and ear pain. Negative for hearing loss and sore throat.    Eyes: Negative for pain and visual disturbance.   Respiratory: Positive for cough. Negative for shortness of breath.   "  Cardiovascular: Negative for chest pain, palpitations and peripheral edema.   Gastrointestinal: Positive for abdominal pain, diarrhea and heartburn. Negative for constipation, hematochezia and nausea.   Breasts:  Positive for tenderness. Negative for breast mass and discharge.   Genitourinary: Positive for frequency, pelvic pain, urgency and vaginal discharge. Negative for dysuria, genital sores, hematuria and vaginal bleeding.   Musculoskeletal: Positive for arthralgias, joint swelling and myalgias.   Skin: Negative for rash.   Neurological: Positive for dizziness. Negative for weakness, headaches and paresthesias.   Psychiatric/Behavioral: Positive for mood changes. The patient is nervous/anxious.           OBJECTIVE:   /62   Pulse 89   Temp 98  F (36.7  C) (Temporal)   Resp 12   Ht 1.61 m (5' 3.39\")   Wt 72.6 kg (160 lb)   LMP 03/07/2019 (Approximate)   SpO2 98%   BMI 28.00 kg/m    Physical Exam  GENERAL: healthy, alert and no distress  EYES: Eyes grossly normal to inspection, PERRL and conjunctivae and sclerae normal no nystagmus.  HENT: ear canals and TM's normal, nose and mouth without ulcers or lesions  NECK: no adenopathy, no asymmetry, masses, or scars and thyroid normal to palpation  RESP: lungs clear to auscultation - no rales, rhonchi or wheezes  BREAST: normal without masses, tenderness or nipple discharge and no palpable axillary masses or adenopathy  CV: regular rate and rhythm, normal S1 S2, no S3 or S4, no murmur, click or rub, no peripheral edema and peripheral pulses strong  ABDOMEN: soft, some tenderness to palpation in the right upper quadrant., no hepatosplenomegaly, no masses and bowel sounds normal   (female): normal female external genitalia, normal urethral meatus, vaginal mucosa pink, moist, well rugated, and normal cervix/adnexa/uterus without masses or discharge   (female): Pap smear done.  MS: no gross musculoskeletal defects noted, no edema  SKIN: 3 mm plantar wart " on the left foot.    NEURO: Normal strength and tone, mentation intact and speech normal  PSYCH: mentation appears normal, affect normal/bright    Diagnostic Test Results:  Results for orders placed or performed in visit on 03/21/19   *UA reflex to Microscopic and Culture ([place]; [facility]; [facility]; [facility]; [facility]; [facility]; [place]; [place])   Result Value Ref Range    Color Urine Yellow     Appearance Urine Clear     Glucose Urine Negative NEG^Negative mg/dL    Bilirubin Urine Negative NEG^Negative    Ketones Urine Negative NEG^Negative mg/dL    Specific Gravity Urine 1.018 1.003 - 1.035    Blood Urine Small (A) NEG^Negative    pH Urine 6.0 5.0 - 7.0 pH    Protein Albumin Urine Negative NEG^Negative mg/dL    Urobilinogen mg/dL 0.0 0.0 - 2.0 mg/dL    Nitrite Urine Negative NEG^Negative    Leukocyte Esterase Urine Negative NEG^Negative    Source Unspecified Urine     RBC Urine <1 0 - 2 /HPF    WBC Urine <1 0 - 5 /HPF    Squamous Epithelial /HPF Urine <1 0 - 1 /HPF    Mucous Urine Present (A) NEG^Negative /LPF   Comprehensive metabolic panel   Result Value Ref Range    Sodium 141 133 - 144 mmol/L    Potassium 4.0 3.4 - 5.3 mmol/L    Chloride 106 94 - 109 mmol/L    Carbon Dioxide 29 20 - 32 mmol/L    Anion Gap 6 3 - 14 mmol/L    Glucose 120 (H) 70 - 99 mg/dL    Urea Nitrogen 14 7 - 30 mg/dL    Creatinine 0.76 0.52 - 1.04 mg/dL    GFR Estimate >90 >60 mL/min/[1.73_m2]    GFR Estimate If Black >90 >60 mL/min/[1.73_m2]    Calcium 8.8 8.5 - 10.1 mg/dL    Bilirubin Total 0.3 0.2 - 1.3 mg/dL    Albumin 4.5 3.4 - 5.0 g/dL    Protein Total 8.1 6.8 - 8.8 g/dL    Alkaline Phosphatase 71 40 - 150 U/L    ALT 19 0 - 50 U/L    AST 18 0 - 45 U/L   CBC with platelets differential   Result Value Ref Range    WBC 7.6 4.0 - 11.0 10e9/L    RBC Count 5.00 3.8 - 5.2 10e12/L    Hemoglobin 14.9 11.7 - 15.7 g/dL    Hematocrit 43.7 35.0 - 47.0 %    MCV 87 78 - 100 fl    MCH 29.8 26.5 - 33.0 pg    MCHC 34.1  31.5 - 36.5 g/dL    RDW 13.1 10.0 - 15.0 %    Platelet Count 244 150 - 450 10e9/L    Diff Method Automated Method     % Neutrophils 52.6 %    % Lymphocytes 35.5 %    % Monocytes 4.7 %    % Eosinophils 5.5 %    % Basophils 1.3 %    % Immature Granulocytes 0.4 %    Nucleated RBCs 0 0 /100    Absolute Neutrophil 4.0 1.6 - 8.3 10e9/L    Absolute Lymphocytes 2.7 0.8 - 5.3 10e9/L    Absolute Monocytes 0.4 0.0 - 1.3 10e9/L    Absolute Basophils 0.1 0.0 - 0.2 10e9/L    Abs Immature Granulocytes 0.0 0 - 0.4 10e9/L    Absolute Nucleated RBC 0.0        ASSESSMENT/PLAN:   1. Encounter for general adult medical examination with abnormal findings  Discussions below.  - Pap imaged thin layer screen with HPV - recommended age 30 - 65 years (select HPV order below)    2. RUQ abdominal pain  Notify her of her lab results and CT scan when these are complete.  She is nontoxic appearing and this is been going on for a while.  Follow-up accordingly.  Exam was fairly unremarkable.  - *UA reflex to Microscopic and Culture ([place]; [facility]; [facility]; [facility]; [facility]; [facility]; [place]; [place])  - Comprehensive metabolic panel  - CBC with platelets differential  - CT Abdomen Pelvis w Contrast; Future    3. Plantar wart  She is going to try over-the-counter medication for this.  Follow-up for cryotherapy if this does not help.    4. Strain of neck muscle, initial encounter  Recommended chiropractic for this.  She has established one in town here and will follow up if not helping.    5. Vertigo  She will talk to the chiropractor about this as well.    6. Screening for colon cancer  We will set this up because of family history.  - GASTROENTEROLOGY ADULT REF PROCEDURE ONLY [facility] [phone]; [place] Provider    7. Family history of colon cancer in mother    - GASTROENTEROLOGY ADULT REF PROCEDURE ONLY [facility] [phone]; [place] Provider    COUNSELING:  Reviewed preventive  "health counseling, as reflected in patient instructions       Regular exercise       Healthy diet/nutrition       Vision screening    BP Readings from Last 1 Encounters:   03/21/19 108/62     Estimated body mass index is 28 kg/m  as calculated from the following:    Height as of this encounter: 1.61 m (5' 3.39\").    Weight as of this encounter: 72.6 kg (160 lb).           reports that  has never smoked. she has never used smokeless tobacco.      Counseling Resources:  ATP IV Guidelines  Pooled Cohorts Equation Calculator  Breast Cancer Risk Calculator  FRAX Risk Assessment  ICSI Preventive Guidelines  Dietary Guidelines for Americans, 2010  USDA's MyPlate  ASA Prophylaxis  Lung CA Screening    Ponce Alvarez MD  [facility]  "

## 2019-03-22 ENCOUNTER — TELEPHONE (OUTPATIENT)
Dept: FAMILY MEDICINE | Facility: CLINIC | Age: 34
End: 2019-03-22

## 2019-03-22 ENCOUNTER — HOSPITAL ENCOUNTER (OUTPATIENT)
Dept: CT IMAGING | Facility: CLINIC | Age: 34
Discharge: HOME | End: 2019-03-22
Attending: FAMILY MEDICINE | Admitting: FAMILY MEDICINE
Payer: COMMERCIAL

## 2019-03-22 DIAGNOSIS — R10.11 RUQ ABDOMINAL PAIN: ICD-10-CM

## 2019-03-22 PROCEDURE — 25000125 ZZHC RX 250: Performed by: RADIOLOGY

## 2019-03-22 PROCEDURE — 25000128 H RX IP 250 OP 636: Performed by: RADIOLOGY

## 2019-03-22 PROCEDURE — 74177 CT ABD & PELVIS W/CONTRAST: CPT

## 2019-03-22 RX ORDER — IOPAMIDOL 755 MG/ML
500 INJECTION, SOLUTION INTRAVASCULAR ONCE
Status: COMPLETED | OUTPATIENT
Start: 2019-03-22 | End: 2019-03-22

## 2019-03-22 RX ADMIN — SODIUM CHLORIDE 60 ML: 9 INJECTION, SOLUTION INTRAVENOUS at 13:03

## 2019-03-22 RX ADMIN — IOPAMIDOL 80 ML: 755 INJECTION, SOLUTION INTRAVENOUS at 13:03

## 2019-03-22 NOTE — TELEPHONE ENCOUNTER
Date of colonoscopy/EGD: 4/11  Surgeon: Dr. Lopez  Prep:Miralax  Packet:Colonoscopy/EGD instructions mailed to patient's home address.   Date: 3/22/2019      Surgery Scheduler

## 2019-03-22 NOTE — LETTER

## 2019-03-22 NOTE — LETTER
[address] [address]  [phone]        March 22, 2019    Mandy Montgomery  [address]  [place] [address]

## 2019-03-25 LAB
COPATH REPORT: NORMAL
PAP: NORMAL

## 2019-03-26 LAB
FINAL DIAGNOSIS: NORMAL
HPV HR 12 DNA CVX QL NAA+PROBE: NEGATIVE
HPV16 DNA SPEC QL NAA+PROBE: NEGATIVE
HPV18 DNA SPEC QL NAA+PROBE: NEGATIVE
SPECIMEN DESCRIPTION: NORMAL
SPECIMEN SOURCE CVX/VAG CYTO: NORMAL

## 2019-03-29 ENCOUNTER — TELEPHONE (OUTPATIENT)
Dept: FAMILY MEDICINE | Facility: CLINIC | Age: 34
End: 2019-03-29

## 2019-03-29 DIAGNOSIS — N28.1 RENAL CYST, RIGHT: Primary | ICD-10-CM

## 2019-03-29 NOTE — TELEPHONE ENCOUNTER
----- Message from Ponce Alvarez MD sent at 3/29/2019  8:05 AM CDT -----  Lab work from last week was fine your CT scan was generally normal but they did see what looks like a cyst in the right kidney which may or may not be of any significance but they suggested a follow-up ultrasound of this area and we can schedule this.

## 2019-03-29 NOTE — TELEPHONE ENCOUNTER
Left message to call back and speak to anyone on the [facility] team.  Relay results below.  ultrasound order in and you can transfer patient to radiology.

## 2019-04-04 ENCOUNTER — OFFICE VISIT (OUTPATIENT)
Dept: FAMILY MEDICINE | Facility: CLINIC | Age: 34
End: 2019-04-04
Payer: COMMERCIAL

## 2019-04-04 VITALS
WEIGHT: 159.8 LBS | RESPIRATION RATE: 18 BRPM | BODY MASS INDEX: 27.96 KG/M2 | OXYGEN SATURATION: 98 % | HEART RATE: 94 BPM | TEMPERATURE: 98 F | SYSTOLIC BLOOD PRESSURE: 118 MMHG | DIASTOLIC BLOOD PRESSURE: 70 MMHG

## 2019-04-04 DIAGNOSIS — J01.00 ACUTE NON-RECURRENT MAXILLARY SINUSITIS: Primary | ICD-10-CM

## 2019-04-04 PROCEDURE — 99213 OFFICE O/P EST LOW 20 MIN: CPT | Performed by: FAMILY MEDICINE

## 2019-04-04 ASSESSMENT — PAIN SCALES - GENERAL: PAINLEVEL: MODERATE PAIN (4)

## 2019-04-04 NOTE — PROGRESS NOTES
SUBJECTIVE:   Mandy Montgomery is a 33 year old female who presents to clinic today for the following health issues:      Acute Illness   Acute illness concerns: Sinus problem   Onset: couple weeks     Fever: no    Chills/Sweats: YES- chill    Headache (location?): no    Sinus Pressure:YES- tender, facial pain and teeth pain    Conjunctivitis:  no    Ear Pain: no    Rhinorrhea: no    Congestion: no    Sore Throat: yes     Cough: YES    Wheeze: no    Decreased Appetite: no    Nausea: no    Vomiting: no    Diarrhea:  no    Dysuria/Freq.: no    Fatigue/Achiness: YES    Sick/Strep Exposure: YES- fmaily     Therapies Tried and outcome: Penicillin, Airborn, Mucinex           Problem list and histories reviewed & adjusted, as indicated.  Additional history: as documented        Reviewed and updated as needed this visit by clinical staff       Reviewed and updated as needed this visit by Provider        SUBJECTIVE:  Mandy  is a 33 year old female who presents for: Symptoms as noted above.  Been going on for several weeks.  She thought she had a dental problem and went to the dentist and he did an x-ray and saw some fluid in the right maxillary sinus base.  She has been on some penicillin.  It has not helped.  She has been taking some Mucinex.    Past Medical History:   Diagnosis Date     History of anxiety      Irregular menstrual cycle      Other seborrheic dermatitis      Pain in joint, site unspecified      Past Surgical History:   Procedure Laterality Date     COLONOSCOPY  1/09    REPEAT AGE 31 AND EVERY 5 YEARS     CYSTOSCOPY N/A 12/29/2016    Procedure: CYSTOSCOPY;  Surgeon: Eder Evans MD;  Location:  OR     LAPAROSCOPIC TUBAL LIGATION Bilateral 12/29/2016    Procedure: LAPAROSCOPIC TUBAL LIGATION;  Surgeon: Kamaljit Gil MD;  Location:  OR     Social History     Tobacco Use     Smoking status: Never Smoker     Smokeless tobacco: Never Used   Substance Use Topics     Alcohol use: Yes      Alcohol/week: 4.2 oz     Types: 7 Glasses of wine per week     Current Outpatient Medications   Medication Sig Dispense Refill     amoxicillin-clavulanate (AUGMENTIN) 875-125 MG tablet Take 1 tablet by mouth 2 times daily for 10 days 20 tablet 0       REVIEW OF SYSTEMS:   5 point ROS negative except as noted above in HPI, including Gen., Resp, CV, GI &  system review.     OBJECTIVE:  Vitals: /70   Pulse 94   Temp 98  F (36.7  C) (Temporal)   Resp 18   Wt 72.5 kg (159 lb 12.8 oz)   LMP 03/07/2019 (Approximate)   SpO2 98%   BMI 27.96 kg/m    BMI= Body mass index is 27.96 kg/m .  She is alert and appears in no distress.  Eyes are normal.  Nose is congested.  Some tenderness to percussion over the right maxillary sinus.  Ears are normal.  Throat with some drainage.  Neck supple no adenopathy.  Lungs are clear.  Heart regular rhythm.    ASSESSMENT:  Sinusitis    PLAN:  Augmentin 875 twice daily discontinue the penicillin continue on the Mucinex.  She is also to follow-up for renal ultrasound to further evaluate a cyst seen on the CT scan        Ponce Alvarez MD  [facility]

## 2019-04-05 ENCOUNTER — TELEPHONE (OUTPATIENT)
Dept: FAMILY MEDICINE | Facility: CLINIC | Age: 34
End: 2019-04-05

## 2019-04-05 ENCOUNTER — HOSPITAL ENCOUNTER (OUTPATIENT)
Dept: ULTRASOUND IMAGING | Facility: CLINIC | Age: 34
Discharge: HOME | End: 2019-04-05
Attending: FAMILY MEDICINE | Admitting: FAMILY MEDICINE
Payer: COMMERCIAL

## 2019-04-05 DIAGNOSIS — N28.1 RENAL CYST, RIGHT: ICD-10-CM

## 2019-04-05 PROCEDURE — 76770 US EXAM ABDO BACK WALL COMP: CPT

## 2019-04-05 NOTE — TELEPHONE ENCOUNTER
----- Message from Ponce Alvarez MD sent at 4/5/2019  1:02 PM CDT -----  The small cyst in the right kidney that appears to be a simple cyst.  Nothing needs to be done about this and it should not cause symptoms

## 2019-04-05 NOTE — RESULT ENCOUNTER NOTE
The small cyst in the right kidney that appears to be a simple cyst.  Nothing needs to be done about this and it should not cause symptoms

## 2019-05-15 ENCOUNTER — ANESTHESIA EVENT (OUTPATIENT)
Dept: GASTROENTEROLOGY | Facility: CLINIC | Age: 34
End: 2019-05-15

## 2019-05-16 ENCOUNTER — ANESTHESIA (OUTPATIENT)
Dept: GASTROENTEROLOGY | Facility: CLINIC | Age: 34
End: 2019-05-16

## 2019-05-16 ENCOUNTER — HOSPITAL ENCOUNTER (OUTPATIENT)
Facility: CLINIC | Age: 34
Discharge: HOME | End: 2019-05-16
Attending: SURGERY | Admitting: SURGERY
Payer: COMMERCIAL

## 2019-05-16 VITALS
RESPIRATION RATE: 16 BRPM | TEMPERATURE: 98.7 F | OXYGEN SATURATION: 100 % | SYSTOLIC BLOOD PRESSURE: 106 MMHG | DIASTOLIC BLOOD PRESSURE: 62 MMHG | HEART RATE: 80 BPM

## 2019-05-16 LAB
COLONOSCOPY: NORMAL
HCG UR QL: NEGATIVE

## 2019-05-16 PROCEDURE — 25000132 ZZH RX MED GY IP 250 OP 250 PS 637: Performed by: SURGERY

## 2019-05-16 PROCEDURE — G0105 COLORECTAL SCRN; HI RISK IND: HCPCS | Performed by: SURGERY

## 2019-05-16 PROCEDURE — 25000128 H RX IP 250 OP 636: Performed by: SURGERY

## 2019-05-16 PROCEDURE — 99153 MOD SED SAME PHYS/QHP EA: CPT | Mod: 59 | Performed by: SURGERY

## 2019-05-16 PROCEDURE — 40000296 ZZH STATISTIC ENDO RECOVERY CLASS 1:2 FIRST HOUR: Performed by: SURGERY

## 2019-05-16 PROCEDURE — 45378 DIAGNOSTIC COLONOSCOPY: CPT | Performed by: SURGERY

## 2019-05-16 PROCEDURE — 81025 URINE PREGNANCY TEST: CPT | Performed by: SURGERY

## 2019-05-16 PROCEDURE — 25800030 ZZH RX IP 258 OP 636: Performed by: SURGERY

## 2019-05-16 PROCEDURE — 99152 MOD SED SAME PHYS/QHP 5/>YRS: CPT | Mod: 59 | Performed by: SURGERY

## 2019-05-16 PROCEDURE — G0500 MOD SEDAT ENDO SERVICE >5YRS: HCPCS | Performed by: SURGERY

## 2019-05-16 RX ORDER — LIDOCAINE 40 MG/G
CREAM TOPICAL
Status: DISCONTINUED | OUTPATIENT
Start: 2019-05-16 | End: 2019-05-16 | Stop reason: HOSPADM

## 2019-05-16 RX ORDER — FENTANYL CITRATE 50 UG/ML
INJECTION, SOLUTION INTRAMUSCULAR; INTRAVENOUS PRN
Status: DISCONTINUED | OUTPATIENT
Start: 2019-05-16 | End: 2019-05-16 | Stop reason: HOSPADM

## 2019-05-16 RX ORDER — SODIUM CHLORIDE, SODIUM LACTATE, POTASSIUM CHLORIDE, CALCIUM CHLORIDE 600; 310; 30; 20 MG/100ML; MG/100ML; MG/100ML; MG/100ML
INJECTION, SOLUTION INTRAVENOUS CONTINUOUS
Status: DISCONTINUED | OUTPATIENT
Start: 2019-05-16 | End: 2019-05-16 | Stop reason: HOSPADM

## 2019-05-16 NOTE — ANESTHESIA PREPROCEDURE EVALUATION
Anesthesia Pre-Procedure Evaluation    Patient: Mandy Montgomery   MRN: [id number] : 1985          Preoperative Diagnosis: family hx colon cancer (mother)    Procedure(s):  COLONOSCOPY    Past Medical History:   Diagnosis Date     History of anxiety      Irregular menstrual cycle      Other seborrheic dermatitis      Pain in joint, site unspecified      Past Surgical History:   Procedure Laterality Date     COLONOSCOPY      REPEAT AGE 31 AND EVERY 5 YEARS     CYSTOSCOPY N/A 2016    Procedure: CYSTOSCOPY;  Surgeon: Eder Evans MD;  Location: [facility] OR     LAPAROSCOPIC TUBAL LIGATION Bilateral 2016    Procedure: LAPAROSCOPIC TUBAL LIGATION;  Surgeon: Kamaljit Gil MD;  Location: [facility] OR       Anesthesia Evaluation     . Pt has had prior anesthetic. Type: MAC, Regional and General    No history of anesthetic complications          ROS/MED HX    ENT/Pulmonary:  - neg pulmonary ROS     Neurologic:  - neg neurologic ROS     Cardiovascular:  - neg cardiovascular ROS   (+) ----. : . . . :. . No previous cardiac testing       METS/Exercise Tolerance:     Hematologic:  - neg hematologic  ROS       Musculoskeletal:  - neg musculoskeletal ROS       GI/Hepatic:     (+) GERD Asymptomatic on medication,       Renal/Genitourinary:  - ROS Renal section negative       Endo:  - neg endo ROS       Psychiatric:     (+) psychiatric history anxiety and depression      Infectious Disease:  - neg infectious disease ROS       Malignancy:      - no malignancy   Other:    - neg other ROS                      Physical Exam  Normal systems: cardiovascular, pulmonary and dental    Airway   Mallampati: II  TM distance: >3 FB  Neck ROM: full    Dental     Cardiovascular   Rhythm and rate: regular and normal      Pulmonary    breath sounds clear to auscultation            Lab Results   Component Value Date    WBC 7.6 2019    HGB 14.9 2019    HCT 43.7 2019     2019    CRP <5.0  "10/11/2013     03/21/2019    POTASSIUM 4.0 03/21/2019    CHLORIDE 106 03/21/2019    CO2 29 03/21/2019    BUN 14 03/21/2019    CR 0.76 03/21/2019     (H) 03/21/2019    TUCKER 8.8 03/21/2019    ALBUMIN 4.5 03/21/2019    PROTTOTAL 8.1 03/21/2019    ALT 19 03/21/2019    AST 18 03/21/2019    ALKPHOS 71 03/21/2019    BILITOTAL 0.3 03/21/2019    TSH 0.66 02/05/2015    HCG Negative 04/19/2015       Preop Vitals  BP Readings from Last 3 Encounters:   04/04/19 118/70   03/21/19 108/62   05/05/18 123/75    Pulse Readings from Last 3 Encounters:   04/04/19 94   03/21/19 89   05/05/18 81      Resp Readings from Last 3 Encounters:   04/04/19 18   03/21/19 12   03/13/18 12    SpO2 Readings from Last 3 Encounters:   04/04/19 98%   03/21/19 98%   05/05/18 99%      Temp Readings from Last 1 Encounters:   04/04/19 98  F (36.7  C) (Temporal)    Ht Readings from Last 1 Encounters:   03/21/19 1.61 m (5' 3.39\")      Wt Readings from Last 1 Encounters:   04/04/19 72.5 kg (159 lb 12.8 oz)    Estimated body mass index is 27.96 kg/m  as calculated from the following:    Height as of 3/21/19: 1.61 m (5' 3.39\").    Weight as of 4/4/19: 72.5 kg (159 lb 12.8 oz).       Anesthesia Plan      History & Physical Review  History and physical reviewed and following examination; no interval change.    ASA Status:  2 .    NPO Status:  > 8 hours    Plan for MAC with Propofol induction. Maintenance will be TIVA.  Reason for MAC:  Deep or markedly invasive procedure (G8)         Postoperative Care      Consents  Anesthetic plan, risks, benefits and alternatives discussed with:  Patient.  Use of blood products discussed: No .   .                 FRANCISCO Chery CRNA  "

## 2019-05-16 NOTE — DISCHARGE INSTRUCTIONS
[facility]    Home Care Following Endoscopy          Activity:    You have just undergone an endoscopic procedure usually performed with conscious sedation.  Do not work or operate machinery (including a car) for at least 12 hours.      I encourage you to walk and attempt to pass this air as soon as possible.    Diet:    Return to the diet you were on before your procedure but eat lightly for the first 12-24 hours.    Drink plenty of water.    Resume any regular medications unless otherwise advised by your physician.  Please begin any new medication prescribed as a result of your procedure as directed by your physician.     If you had any biopsy or polyp removed please refrain from aspirin or aspirin products for 2 days.  If on Coumadin please restart as instructed by your physician.   Pain:    You may take Tylenol as needed for pain.  Expected Recovery:    You can expect some mild abdominal fullness and/or discomfort due to the air used to inflate your intestinal tract. It is also normal to have a mild sore throat after upper endoscopy.    Call Your Physician if You Have:      After Colonoscopy:  o Worsening persisting abdominal pain which is worse with activity.  o Fevers (>101 degrees F), chills or shakes.  o Passage of continued blood with bowel movements.   o   Any questions or concerns about your recovery, please call [phone] or after hours [phone] Nurse Advice Line.    Follow-up Care:    You should receive a call or letter with your results within 1 week. Please call if you have not received a notification of your results.  If asked to return to clinic please make an appointment 1 week after your procedure.  Call [phone].     [facility] Same-Day Surgery   Adult Discharge Orders & Instructions     For 24 hours after surgery    1. Get plenty of rest.  A responsible adult must stay with you for at least 24 hours after you leave the hospital.   2. Do not drive or use heavy  equipment.  If you have weakness or tingling, don't drive or use heavy equipment until this feeling goes away.  3. Do not drink alcohol.  4. Avoid strenuous or risky activities.  Ask for help when climbing stairs.   5. You may feel lightheaded.  If so, sit for a few minutes before standing.  Have someone help you get up.   6. You may have a slight fever. Call the doctor if your fever is over 100 F (37.7 C) (taken under the tongue) or lasts longer than 24 hours.  7. You may have a dry mouth, a sore throat, muscle aches or trouble sleeping.  These should go away after 24 hours.  8. Do not make important or legal decisions.  We don t expect you to have any problems from the surgery or treatment you had today. Just in case, here s what to do if you have pain, upset stomach (nausea), bleeding or infection:  Pain:  Take medicines your doctor has prescribed or over-the-counter medicine they have suggested. Resting and using ice packs can help, too. For surgery on an arm or leg, raise it on a pillow to ease swelling. Call your doctor if these methods don t work.  Copyright [employer], Licensed under [employer]  Upset stomach (nausea):  Take anti-nausea medicine approved by your doctor. Drink clear liquids like apple juice, ginger ale, broth or 7-Up. Be sure to drink enough fluids. Rest can help, too. Move to normal foods when you re ready. Bleeding:  In the first 24 hours, you may see a little blood on your dressing, about the size of a quarter. You don t need to worry about this much blood, but if the blood spot keeps getting bigger:    Put pressure on the wound if you can, AND    Call your doctor.  Copyright [employer], Licensed under [employer]  Fever/Infection: Please call your doctor if you have any of these signs:    Redness    Swelling    Wound feels warm    Pain gets worse    Bad-smelling fluid leaks from wound    Fever or chills  Call your doctor for any of the followin.  It has been  over 8 to 10 hours since surgery and you are still not able to urinate (pass water).    2.  Headache for over 24 hours.    3.  Numbness, tingling or weakness in your legs the day after surgery (if you had spinal anesthesia).    Nurse advice line: [phone]

## 2019-05-16 NOTE — H&P
[facility]    Pre-Endoscopy History and Physical     Mandy Montgomery MRN# [id number]   YOB: 1985 Age: 33 year old     Date of Procedure: 5/16/2019  Primary care provider: Kamaljit Gil  Type of Endoscopy: Colonoscopy with possible biopsy, possible polypectomy  Reason for Procedure: surviellence  Type of Anesthesia Anticipated: Conscious Sedation    HPI:    Mandy is a 33 year old female who will be undergoing the above procedure.  famhx of colon cancer in mom at 45 y.o; first colonoscopy at 23 y.o; tubal ligation; cystoscopy.  No issues otherwise; no bleeding; no weight loss.  No black stool.      A history and physical has been performed. The patient's medications and allergies have been reviewed. The risks and benefits of the procedure and the sedation options and risks were discussed with the patient.  All questions were answered and informed consent was obtained.      She denies a personal or family history of anesthesia complications or bleeding disorders.     Patient Active Problem List   Diagnosis     Mild major depression (H)     CARDIOVASCULAR SCREENING; LDL GOAL LESS THAN 160     GERD (gastroesophageal reflux disease)     Family history of colon cancer        Past Medical History:   Diagnosis Date     History of anxiety      Irregular menstrual cycle      Other seborrheic dermatitis      Pain in joint, site unspecified         Past Surgical History:   Procedure Laterality Date     COLONOSCOPY  1/09    REPEAT AGE 31 AND EVERY 5 YEARS     CYSTOSCOPY N/A 12/29/2016    Procedure: CYSTOSCOPY;  Surgeon: Eder Evans MD;  Location: [facility]     LAPAROSCOPIC TUBAL LIGATION Bilateral 12/29/2016    Procedure: LAPAROSCOPIC TUBAL LIGATION;  Surgeon: Kamaljit Gil MD;  Location:  OR       Social History     Tobacco Use     Smoking status: Never Smoker     Smokeless tobacco: Never Used   Substance Use Topics     Alcohol use: Yes     Alcohol/week: 4.2 oz      "Types: 7 Glasses of wine per week       Family History   Problem Relation Age of Onset     Cerebrovascular Disease Father         BRAIN ANEURYSM in his early 30s     Genitourinary Problems Father         polycystic kidney disease     Other - See Comments Father 51         in a motorcycle accident     Hypertension Maternal Grandmother      Hypertension Maternal Grandfather      Cancer - colorectal Mother 45     Other - See Comments Sister         older     Post-Traumatic Stress Disorder (PTSD) Sister      Other - See Comments Brother 20         in an MVA       Prior to Admission medications    Not on File       Allergies   Allergen Reactions     Azithromycin Rash     PT NOT SURE        REVIEW OF SYSTEMS:   5 point ROS negative except as noted above in HPI, including Gen., Resp., CV, GI &  system review.    PHYSICAL EXAM:   BP 99/59   Pulse 94   Temp 98.7  F (37.1  C) (Oral)   Resp 12   SpO2 99%  Estimated body mass index is 27.96 kg/m  as calculated from the following:    Height as of 3/21/19: 1.61 m (5' 3.39\").    Weight as of 19: 72.5 kg (159 lb 12.8 oz).   Constitutional: Awake, alert, no acute distress.  Eyes: No scleral icterus.  Conjunctiva are without injection.  ENMT: Mucous membranes moist, dentition and gums are intact.   Neck: Soft, supple, trachea midline.    Endocrine: The thyroid is without masses and mobile with swallow.   Lymphatic: There is no cervical, submandibular, supraclavicular/infraclavicular, axillary, or inguinal adenopathy.  Respiratory: Lungs are clear to auscultation and percussion bilaterally.   Cardiovascular: Regular rate and rhythm. No murmurs, rubs, or gallops.    Abdomen: Non-distended, non-tender, normoactive bowel sounds present, No masses  Musculoskeletal: No spinal or CVA tenderness. Full range of motion in the upper and lower extremities.    Skin: No skin rashes or lesions to inspection.  No petechia.    Neurologic: Cranial nerves II through XII are grossly " intact and symmetric.  Psychiatric: The patient is alert and oriented times 3.  The patient's affect is not blunted and mood is appropriate.  DIAGNOSTICS:    paveliolvernon hx of colon cancer    IMPRESSION   ASA Class 1 - Healthy patient, no medical problems    PLAN:   Plan for Colonoscopy with possible biopsy, possible polypectomy. We discussed the risks, benefits and alternatives and the patient wished to proceed.  Patient is cleared for the above procedure.    The above has been forwarded to the consulting provider.    [facility], [facility]

## 2019-08-06 ENCOUNTER — OFFICE VISIT (OUTPATIENT)
Dept: URGENT CARE | Facility: RETAIL CLINIC | Age: 34
End: 2019-08-06
Payer: COMMERCIAL

## 2019-08-06 VITALS
HEART RATE: 79 BPM | SYSTOLIC BLOOD PRESSURE: 121 MMHG | DIASTOLIC BLOOD PRESSURE: 78 MMHG | OXYGEN SATURATION: 100 % | TEMPERATURE: 98.2 F

## 2019-08-06 DIAGNOSIS — H60.391 INFECTIVE OTITIS EXTERNA, RIGHT: Primary | ICD-10-CM

## 2019-08-06 DIAGNOSIS — M26.609 TMJ (TEMPOROMANDIBULAR JOINT SYNDROME): ICD-10-CM

## 2019-08-06 PROCEDURE — 99213 OFFICE O/P EST LOW 20 MIN: CPT | Performed by: FAMILY MEDICINE

## 2019-08-06 RX ORDER — POLYMYXIN B SULFATE AND TRIMETHOPRIM 1; 10000 MG/ML; [USP'U]/ML
3 SOLUTION OPHTHALMIC 3 TIMES DAILY
Qty: 1 BOTTLE | Refills: 0 | Status: SHIPPED | OUTPATIENT
Start: 2019-08-06 | End: 2019-08-07

## 2019-08-06 NOTE — PROGRESS NOTES
SUBJECTIVE:  Mandy Montgomery is a 33 year old female who presents with right ear pain for 2 day(s).   Severity: moderate   Timing:gradual onset and still present  Additional symptoms include none and no swimming.      History of recurrent otitis: no    Past Medical History:   Diagnosis Date     History of anxiety      Irregular menstrual cycle      Other seborrheic dermatitis      Pain in joint, site unspecified      No current outpatient medications on file.     History   Smoking Status     Never Smoker   Smokeless Tobacco     Never Used       ROS:   PSYCHIATRIC: high stress, 4 children and full time job.    OBJECTIVE:  /78   Pulse 79   Temp 98.2  F (36.8  C) (Oral)   SpO2 100%   The right TM is normal: no effusions, no erythema, and normal landmarks     The right auditory canal is swollen and tender  The left TM is normal: no effusions, no erythema, and normal landmarks  The left auditory canal is normal and without drainage, edema or erythema  Oropharynx exam is normal: no lesions, erythema, adenopathy or exudate and rt tmj tender to palpation.  GENERAL: mild distress  EYES: EOMI,  PERRL, conjunctiva clear  NECK: supple, non-tender to palpation, no adenopathy noted  RESP: lungs clear to auscultation - no rales, rhonchi or wheezes  CV: regular rates and rhythm, normal S1 S2, no murmur noted  SKIN: no suspicious lesions or rashes     ASSESSMENT:  Otitis Externa, right and TMJ dysfunction and pain    PLAN:  Printed info on tmj, polytrim ear drops.  Follow up with primary care provider if no improvement.

## 2019-08-06 NOTE — PATIENT INSTRUCTIONS
Patient Education     Helping Your Temporomandibular Joint (TMJ) Heal    The temporomandibular joint (TMJ) is a ball-and-socket joint located where the upper and lower jaws meet. When the TMJ and related muscles are injured, they need time to heal. Self-care is very important. You can take steps to reduce pressure on the TMJ and speed healing.  Eating with care  Chewing strains the TMJ. When symptoms are bad, you may not be able to chew at all. To get you through times when your symptoms are at their worst, try these tips:    Choose soft foods. These include scrambled eggs, oatmeal, yogurt, quiche, tofu, soup, smoothies, pasta, fish, mashed potatoes, milkshakes, bananas, applesauce, gelatin, or ice cream.    Don t bite into hard foods. These include whole apples, carrots, and corn on the cob. Instead cut foods into bite-sized pieces.    Grind or finely chop meats and other tough foods. Try hamburger meat instead of steak.  Using ice and heat  Your healthcare provider may suggest using ice and heat. Ice helps reduce swelling and pain. Heat helps relax muscles, increasing blood flow.    Use a gel pack or cold pack for severe pain. Apply for 10 to 20 minutes. Repeat as needed. To make a cold pack, put ice cubes in a plastic bag that seals at the top. Wrap the bag in a clean, thin towel or cloth. Never put ice or a cold pack directly on the skin.    Use moist heat for mild to moderate muscle pain. Apply a moist, warm towel to the muscles for 10 to 20 minutes. Repeat as needed.  Staying away from triggers  Certain activities (called triggers) strain the TMJ, making symptoms worse. The tips below can help you avoid common triggers and limit strain.    Don t eat hard or chewy foods. These include nuts, pretzels, popcorn, chips, gum, caramel, gummy candies, carrots, whole apples, hard breads, and even ice.    Reschedule routine dental visits, like cleanings, if your jaw aches. If you have severe pain, call your healthcare  provider.    Support your jaw when yawning. When you feel a yawn coming on, put a fist under your jaw. Apply gentle pressure. This helps prevent wide, painful yawns.    Don t do any activity that hurts. This includes nail biting, yelling, and singing.  Maintaining good posture  Work at improving your posture during the day and when you sleep. Good posture can help your body heal. Try these tips:    Use a headset when on the phone. Don t cradle the phone with your shoulder.    Keep ergonomics in mind. This includes making sure your workstation fits your body. Support your lower back. Take breaks often to stretch and rest. If you use a computer, keep the monitor at eye level.    Keep your head in a neutral position.  Keep your ears in line with your shoulders. Don t slouch or crane your head forward.    Use an orthopedic pillow. Use this to support your head and neck during sleep.  Date Last Reviewed: 8/1/2017 2000-2018 [employer]. [address] [place] [address]. All rights reserved. This information is not intended as a substitute for professional medical care. Always follow your healthcare professional's instructions.           Patient Education     When You Have Temporomandibular Disorder (TMD)  The temporomandibular joint (TMJ) is a ball-and-socket joint located where the upper and lower jaws meet. The TMJ and its nearby muscles make up a complex, loosely connected system. Because of this, a problem in one part of the system can affect the other parts. This can cause you to have temporomandibular disorder (TMD).         How the temporomandibular joint works  You have 1 joint on each side of your mouth that together make up the TMJ. These joints are part of a large group of muscles, ligaments, and bones that work together as a system. When the system is healthy, you can talk, chew, and even yawn in comfort. Muscles contract and relax to open and close the joint. The disk is made of  cartilage and is located between the condyle and the skull. It absorbs pressure in the joint and allows the jaws to open and close smoothly. Fluid (called synovial fluid) lubricates the joints. Ligaments connect the lower jaw bones to the skull. They also support the joint.  Common temporomandibular problems  When there is a problem with the TMJ and its related system, you can develop TMD. Common TMD problems include tight muscles, inflamed joints, and damaged joints.  In some cases, symptoms may be related to the teeth or bite.  Tight muscles  The muscles surrounding the TMJ can tighten (spasm) and cause pain.    Referred pain happens in a part of the body separate from the source of the problem. For example, pain in the face or teeth could be coming from a problem in the TMJ.    Myofascial pain happens in soft tissues, like muscle. Trigger points in these pain areas often cause referred pain. You may feel jaw, neck, or shoulder pain.  Inflamed joints  Inflammation may include pain, redness, heat, swelling, or loss of function.    Synovitis happens when certain tissues surrounding the TMJ become inflamed. It causes pain that increases with jaw movement.    Inflamed ligaments can be caused by strain or injury. When this happens, the ligaments are unable to support the joint.    Rheumatoid arthritis is a joint disease. It leads to inflammation in the TMJ.  Damaged joints  Many people hear clicking when their jaw moves. If you feel pain along with the noise, the joint may be damaged.    Impingement happens when the disk slips out of place (displacement). This causes the jaw to catch. As the disk slips, you may hear a clicking sound.    Locked jaw happens when the disk gets stuck in one position. As a result, the jaw locks open or closed.    Osteoarthritis is a joint disease. It causes the TMJ to wear away (degenerate). This leads to pain during movement.  Other problems  The parts of the jaw and mouth make up a single  unit. That s why a problem in 1 area can cause symptoms elsewhere. Teeth or bite problems linked to TMD include:    Grinding your teeth side to side (bruxism)    Biting down on your teeth (clenching)    When the teeth or bite is out of alignment (malocclusion)  Your healthcare provider will give you more information about these problems if needed.   Date Last Reviewed: 8/1/2017 2000-2018 [employer]. [address] [place] [address], [place] [address]. All rights reserved. This information is not intended as a substitute for professional medical care. Always follow your healthcare professional's instructions.           Patient Education     External Ear Infection (Adult)    External otitis (also called  swimmer s ear ) is an infection in the ear canal. It is often caused by bacteria or fungus. It can occur a few days after water gets trapped in the ear canal (from swimming or bathing). It can also occur after cleaning too deeply in the ear canal with a cotton swab or other object. Sometimes, hair care products get into the ear canal and cause this problem.  Symptoms can include pain, fever, itching, redness, drainage, or swelling of the ear canal. Temporary hearing loss may also occur.  Home care    Do not try to clean the ear canal. This can push pus and bacteria deeper into the canal.    Use prescribed ear drops as directed. These help reduce swelling and fight the infection. If an ear wick was placed in the ear canal, apply drops right onto the end of the wick. The wick will draw the medicine into the ear canal even if it is swollen closed.    A cotton ball may be loosely placed in the outer ear to absorb any drainage.    You may use acetaminophen or ibuprofen to control pain, unless another medicine was prescribed. Note: If you have chronic liver or kidney disease or ever had a stomach ulcer or GI bleeding, talk to your healthcare provider before taking any of these medicines.    Do not allow water to get  into your ear when bathing. Also, don't swim until the infection has cleared.  Prevention    Keep your ears dry. This helps lower the risk of infection. Dry your ears with a towel or hair dryer after getting wet. Also, use ear plugs when swimming.    Do not stick any objects in the ear to remove wax.    If you feel water trapped in your ear, use ear drops right away. You can get these drops over the counter at most drugstores. They work by removing water from the ear canal.  Follow-up care  Follow up with your healthcare provider in 1 week, or as advised.  When to seek medical advice  Call your healthcare provider right away if any of these occur:    Ear pain becomes worse or doesn t improve after 3 days of treatment    Redness or swelling of the outer ear occurs or gets worse    Headache    Painful or stiff neck    Drowsiness or confusion    Fever of 100.4 F (38 C) or higher, or as directed by your healthcare provider    Seizure  Date Last Reviewed: 10/1/2017    7552-7698 [employer]. [address] [place] [address], [place] [address]. All rights reserved. This information is not intended as a substitute for professional medical care. Always follow your healthcare professional's instructions.

## 2019-08-07 ENCOUNTER — HOSPITAL ENCOUNTER (EMERGENCY)
Facility: CLINIC | Age: 34
Discharge: HOME | End: 2019-08-07
Attending: FAMILY MEDICINE | Admitting: FAMILY MEDICINE
Payer: COMMERCIAL

## 2019-08-07 VITALS
DIASTOLIC BLOOD PRESSURE: 95 MMHG | BODY MASS INDEX: 26.39 KG/M2 | WEIGHT: 150.8 LBS | SYSTOLIC BLOOD PRESSURE: 135 MMHG | OXYGEN SATURATION: 100 % | TEMPERATURE: 98.5 F | RESPIRATION RATE: 18 BRPM

## 2019-08-07 DIAGNOSIS — H60.391 INFECTIVE OTITIS EXTERNA, RIGHT: ICD-10-CM

## 2019-08-07 PROCEDURE — 99283 EMERGENCY DEPT VISIT LOW MDM: CPT | Performed by: FAMILY MEDICINE

## 2019-08-07 PROCEDURE — 99284 EMERGENCY DEPT VISIT MOD MDM: CPT | Mod: Z6 | Performed by: FAMILY MEDICINE

## 2019-08-07 RX ORDER — TOBRAMYCIN AND DEXAMETHASONE 3; 1 MG/ML; MG/ML
2 SUSPENSION/ DROPS OPHTHALMIC EVERY 4 HOURS
Qty: 1 BOTTLE | Refills: 0 | Status: SHIPPED | OUTPATIENT
Start: 2019-08-07 | End: 2020-01-02

## 2019-08-07 RX ORDER — CIPROFLOXACIN AND DEXAMETHASONE 3; 1 MG/ML; MG/ML
4 SUSPENSION/ DROPS AURICULAR (OTIC) 2 TIMES DAILY
Qty: 1 BOTTLE | Refills: 0 | Status: SHIPPED | OUTPATIENT
Start: 2019-08-07 | End: 2019-08-07

## 2019-08-07 RX ORDER — HYDROCODONE BITARTRATE AND ACETAMINOPHEN 5; 325 MG/1; MG/1
1 TABLET ORAL EVERY 6 HOURS PRN
Qty: 6 TABLET | Refills: 0 | Status: SHIPPED | OUTPATIENT
Start: 2019-08-07 | End: 2020-01-02

## 2019-08-07 NOTE — ED AVS SNAPSHOT
[facility] Emergency Department  [address] [place] [address]  Phone:  [phone]  Fax:  [phone]                                    Mandy Montgomery   MRN: [id number]    Department:  [facility] Emergency Department   Date of Visit:  8/7/2019           After Visit Summary Signature Page    I have received my discharge instructions, and my questions have been answered. I have discussed any challenges I see with this plan with the nurse or doctor.    ..........................................................................................................................................  Patient/Patient Representative Signature      ..........................................................................................................................................  Patient Representative Print Name and Relationship to Patient    ..................................................               ................................................  Date                                   Time    ..........................................................................................................................................  Reviewed by Signature/Title    ...................................................              ..............................................  Date                                               Time          22EPIC Rev 08/18

## 2019-08-08 NOTE — ED PROVIDER NOTES
History     Chief Complaint   Patient presents with     Otalgia     HPI  Mandy Montgomery is a 33 year old female who presents with continued right ear pain.  Patient was diagnosed with an otitis externa yesterday and placed on Cortisporin drops.  Patient comes in tonight because he feels like the pain and swelling has been getting progressively worse.  Patient denies any fevers or chills.    Allergies:  Allergies   Allergen Reactions     Azithromycin Rash     PT NOT SURE       Problem List:    Patient Active Problem List    Diagnosis Date Noted     Family history of colon cancer 2015     Priority: Medium     Needs colonoscopy at 36       GERD (gastroesophageal reflux disease) 2011     Priority: Medium     CARDIOVASCULAR SCREENING; LDL GOAL LESS THAN 160 10/31/2010     Priority: Medium     Mild major depression (H) 2009     Priority: Medium        Past Medical History:    Past Medical History:   Diagnosis Date     History of anxiety      Irregular menstrual cycle      Other seborrheic dermatitis      Pain in joint, site unspecified        Past Surgical History:    Past Surgical History:   Procedure Laterality Date     COLONOSCOPY      REPEAT AGE 31 AND EVERY 5 YEARS     COLONOSCOPY N/A 2019    Procedure: COLONOSCOPY;  Surgeon: Ricardo Lopez MD;  [facility]     CYSTOSCOPY N/A 2016    Procedure: CYSTOSCOPY;  Surgeon: Eder Evans MD;  Location[facility]     LAPAROSCOPIC TUBAL LIGATION Bilateral 2016    Procedure: LAPAROSCOPIC TUBAL LIGATION;  Surgeon: Kamaljit Gil MD;  Location: [facility] OR       Family History:    Family History   Problem Relation Age of Onset     Cerebrovascular Disease Father         BRAIN ANEURYSM in his early 30s     Genitourinary Problems Father         polycystic kidney disease     Other - See Comments Father 51         in a motorcycle accident     Hypertension Maternal Grandmother      Hypertension Maternal Grandfather      Cancer -  colorectal Mother 45     Other - See Comments Sister         older     Post-Traumatic Stress Disorder (PTSD) Sister      Other - See Comments Brother 20         in an MVA       Social History:  Marital Status:   [2]  Social History     Tobacco Use     Smoking status: Never Smoker     Smokeless tobacco: Never Used   Substance Use Topics     Alcohol use: Yes     Alcohol/week: 4.2 oz     Types: 7 Glasses of wine per week     Drug use: No        Medications:      ciprofloxacin-dexamethasone (CIPRODEX) 0.3-0.1 % otic suspension   HYDROcodone-acetaminophen (NORCO) 5-325 MG tablet         Review of Systems   All other systems reviewed and are negative.      Physical Exam   BP: (!) 135/95  Heart Rate: 86  Temp: 98.5  F (36.9  C)  Resp: 18  Weight: 68.4 kg (150 lb 12.8 oz)  SpO2: 100 %      Physical Exam   Constitutional: She appears well-developed and well-nourished. No distress.   HENT:   Right Ear: There is drainage, swelling and tenderness.   Skin: She is not diaphoretic.   Nursing note and vitals reviewed.      ED Course        Procedures    An ear wick was placed here in the emergency department and we will change the antibiotics to Ciprodex for a stronger steroid which should help with the swelling better.  Patient will follow-up in clinic if there is no improvement over the next 2 days.    Assessments & Plan (with Medical Decision Making)   otitis externa     I have reviewed the nursing notes.    I have reviewed the findings, diagnosis, plan and need for follow up with the patient.         Medication List      Started    ciprofloxacin-dexamethasone 0.3-0.1 % otic suspension  Commonly known as:  CIPRODEX  4 drops, Right Ear, 2 TIMES DAILY     HYDROcodone-acetaminophen 5-325 MG tablet  Commonly known as:  NORCO  1 tablet, Oral, EVERY 6 HOURS PRN        Discontinued    trimethoprim-polymyxin b 99691-3.1 UNIT/ML-% ophthalmic solution  Commonly known as:  POLYTRIM            Final diagnoses:   Infective otitis  externa, right       8/7/2019   [facility] EMERGENCY DEPARTMENT     Viral Heaton MD  08/07/19 [id number]

## 2019-11-09 ENCOUNTER — HEALTH MAINTENANCE LETTER (OUTPATIENT)
Age: 34
End: 2019-11-09

## 2020-01-02 ENCOUNTER — OFFICE VISIT (OUTPATIENT)
Dept: URGENT CARE | Facility: RETAIL CLINIC | Age: 35
End: 2020-01-02
Payer: COMMERCIAL

## 2020-01-02 VITALS
OXYGEN SATURATION: 98 % | HEART RATE: 96 BPM | SYSTOLIC BLOOD PRESSURE: 123 MMHG | TEMPERATURE: 99.1 F | DIASTOLIC BLOOD PRESSURE: 81 MMHG

## 2020-01-02 DIAGNOSIS — J02.9 ACUTE PHARYNGITIS, UNSPECIFIED ETIOLOGY: Primary | ICD-10-CM

## 2020-01-02 DIAGNOSIS — J10.1 INFLUENZA B: ICD-10-CM

## 2020-01-02 LAB
FLUAV AG UPPER RESP QL IA.RAPID: ABNORMAL
FLUBV AG UPPER RESP QL IA.RAPID: ABNORMAL
S PYO AG THROAT QL IA.RAPID: NORMAL

## 2020-01-02 PROCEDURE — 87804 INFLUENZA ASSAY W/OPTIC: CPT | Mod: QW | Performed by: INTERNAL MEDICINE

## 2020-01-02 PROCEDURE — 87880 STREP A ASSAY W/OPTIC: CPT | Mod: QW | Performed by: INTERNAL MEDICINE

## 2020-01-02 PROCEDURE — 87081 CULTURE SCREEN ONLY: CPT | Performed by: INTERNAL MEDICINE

## 2020-01-02 PROCEDURE — 99213 OFFICE O/P EST LOW 20 MIN: CPT | Performed by: INTERNAL MEDICINE

## 2020-01-02 RX ORDER — OSELTAMIVIR PHOSPHATE 75 MG/1
75 CAPSULE ORAL 2 TIMES DAILY
Qty: 10 CAPSULE | Refills: 0 | Status: SHIPPED | OUTPATIENT
Start: 2020-01-02 | End: 2020-01-31

## 2020-01-02 NOTE — PROGRESS NOTES
[facility] Progress Note        Santo Billingsley MD, MPH  01/02/2020        History:      Mandy Montgomery is a pleasant 34 year old year old female with a chief complaint of sore throat, nasal ccongestion and fever,malaise and body aches since 2 days ago.   No dyspnea or wheezing.   No smoking history.   No headache or neck pain.  No GI or  symptoms.   No rash.         Assessment and Plan:        - BETA STREP GROUP A R/O CULTURE  - RAPID STREP SCREEN: Negative.  - INFLUENZA A/B ANTIGEN: Positive for B antigen.    Influenza B:  - oseltamivir (TAMIFLU) 75 MG capsule; Take 1 capsule (75 mg) by mouth 2 times daily for 5 days  Dispense: 10 capsule; Refill: 0   Discussed the contagious nature of influenza B and Discussed supportive care with the patient.  Advised to increase fluid intake and rest for the next 3 days.  Patient was advised to use throat lozenges and gargle with salt water for symptomatic relief.  Tylenol 650 mg po for pain and fever q 6 hours as needed.  F/u w PCP in 4-5 days, earlier if symptoms worsen.                   Physical Exam:      /81   Pulse 96   Temp 99.1  F (37.3  C) (Oral)   SpO2 98%      Constitutional: Patient is in no distress The patient is pleasant and cooperative.   HEENT: Head:  Head is atraumatic, normocephalic.    Eyes: Pupils are equal, round and reactive to light and accomodation.  Sclera is non-icteric. No conjunctival injection, or exudate noted. Extraocular motion is intact. Visual acuity is intact bilaterally.  Ears:  External acoustic canals are patent and clear.  There is no erythema and bulging( exudate)  of the ( R/L ) tympanic membrane(s ).   Nose:  Nasal congestion w clear drainage or mucosal ulceration is noted.  Throat:  Oral mucosa is moist.  No oral lesions are noted. Posterior pharyngeal hyperemia w/o exudate noted.     Neck Supple.  There is no cervical lymphadenopathy.  No nuchal rigidity noted.  There is no meningismus.      Cardiovascular: Heart is regular to rate and rhythm.  No murmur is noted.     Lungs: Clear in the anterior and posterior pulmonary fields.   Abdomen: Soft and non-tender.    Back No flank tenderness is noted.   Extremeties No edema, no calf tenderness.   Neuro: No focal deficit.   Skin No petechiae or purpura is noted.  There is no rash.   Mood Normal              Data:      All new lab and imaging data was reviewed.   Results for orders placed or performed in visit on 01/02/20   RAPID STREP SCREEN     Status: Normal   Result Value Ref Range    Rapid Strep A Screen neg neg   INFLUENZA A/B ANTIGEN     Status: Abnormal   Result Value Ref Range    Influenza A neg neg    Influenza B pos neg

## 2020-01-02 NOTE — LETTER
[facility]  [address]  [facility] [address]  Phone: [phone]    January 2, 2020        Mandy Montgomery  [address]  [place] [facility] [place] [address]          To whom it may concern:    RE: Mandy Montgomery    Patient was seen and treated today at our clinic. She is advised to rest today and the next 3 days. Please contact me for questions or concerns.      Sincerely,        Santo Billingsley MD,MPH

## 2020-01-04 LAB
BACTERIA SPEC CULT: NORMAL
SPECIMEN SOURCE: NORMAL

## 2020-01-31 ENCOUNTER — OFFICE VISIT (OUTPATIENT)
Dept: FAMILY MEDICINE | Facility: CLINIC | Age: 35
End: 2020-01-31
Payer: COMMERCIAL

## 2020-01-31 VITALS
HEART RATE: 100 BPM | BODY MASS INDEX: 26.91 KG/M2 | WEIGHT: 153.8 LBS | OXYGEN SATURATION: 100 % | DIASTOLIC BLOOD PRESSURE: 78 MMHG | TEMPERATURE: 98.4 F | SYSTOLIC BLOOD PRESSURE: 110 MMHG | RESPIRATION RATE: 16 BRPM

## 2020-01-31 DIAGNOSIS — H60.502 ACUTE OTITIS EXTERNA OF LEFT EAR, UNSPECIFIED TYPE: Primary | ICD-10-CM

## 2020-01-31 PROCEDURE — 99213 OFFICE O/P EST LOW 20 MIN: CPT | Performed by: FAMILY MEDICINE

## 2020-01-31 RX ORDER — CIPROFLOXACIN AND DEXAMETHASONE 3; 1 MG/ML; MG/ML
4 SUSPENSION/ DROPS AURICULAR (OTIC) 2 TIMES DAILY
Qty: 7.5 ML | Refills: 0 | Status: SHIPPED | OUTPATIENT
Start: 2020-01-31 | End: 2021-08-31

## 2020-01-31 RX ORDER — POLYMYXIN B SULFATE AND TRIMETHOPRIM 1; 10000 MG/ML; [USP'U]/ML
SOLUTION OPHTHALMIC
Qty: 1 BOTTLE | Refills: 0 | Status: CANCELLED | OUTPATIENT
Start: 2020-01-31

## 2020-01-31 RX ORDER — TOBRAMYCIN AND DEXAMETHASONE 3; 1 MG/ML; MG/ML
1-2 SUSPENSION/ DROPS OPHTHALMIC EVERY 4 HOURS
Qty: 1 BOTTLE | Refills: 0 | Status: SHIPPED | OUTPATIENT
Start: 2020-01-31 | End: 2020-02-05

## 2020-01-31 ASSESSMENT — PAIN SCALES - GENERAL: PAINLEVEL: MILD PAIN (3)

## 2020-01-31 NOTE — PROGRESS NOTES
Subjective     Mandy Montgomery is a 34 year old female who presents to clinic today for the following health issues:    HPI   Acute Illness   Acute illness concerns: ear pain  Onset: 1 day    Fever: no    Chills/Sweats: no    Headache (location?): no    Sinus Pressure:no    Conjunctivitis:  no    Ear Pain: YES: left    Rhinorrhea: no    Congestion: no    Sore Throat: no     Cough: no    Wheeze: no    Decreased Appetite: no    Nausea: no    Vomiting: no    Diarrhea:  no    Dysuria/Freq.: no    Fatigue/Achiness: no    Sick/Strep Exposure: no     Therapies Tried and outcome: n/a    Mandy is here today with 2 days of left ear pain. She was using a q-tip to scratch her ear canal and a day later she felt pain and swelling in the canal. She has not noticed any drainage from her left ear. No difficulty hearing out of left ear but feels it is plugged. She had a similar episode of this in her right ear a few months ago. At that time, she was given Polytrim which did not improve her symptoms. She went to the ED and was switched to Ciprodex that then resolved her symptoms. She did not notice any    Patient Active Problem List   Diagnosis     Mild major depression (H)     CARDIOVASCULAR SCREENING; LDL GOAL LESS THAN 160     GERD (gastroesophageal reflux disease)     Family history of colon cancer     Past Surgical History:   Procedure Laterality Date     COLONOSCOPY  1/09    REPEAT AGE 31 AND EVERY 5 YEARS     COLONOSCOPY N/A 5/16/2019    Procedure: COLONOSCOPY;  Surgeon: Ricardo Lopez MD;  Location:  GI     CYSTOSCOPY N/A 12/29/2016    Procedure: CYSTOSCOPY;  Surgeon: Eder vEans MD;  Location:  OR     LAPAROSCOPIC TUBAL LIGATION Bilateral 12/29/2016    Procedure: LAPAROSCOPIC TUBAL LIGATION;  Surgeon: Kamaljit Gil MD;  Location:  OR       Social History     Tobacco Use     Smoking status: Never Smoker     Smokeless tobacco: Never Used   Substance Use Topics     Alcohol use: Yes     Alcohol/week: 7.0  standard drinks     Types: 7 Glasses of wine per week     Family History   Problem Relation Age of Onset     Cerebrovascular Disease Father         BRAIN ANEURYSM in his early 30s     Genitourinary Problems Father         polycystic kidney disease     Other - See Comments Father 51         in a motorcycle accident     Hypertension Maternal Grandmother      Hypertension Maternal Grandfather      Cancer - colorectal Mother 45     Other - See Comments Sister         older     Post-Traumatic Stress Disorder (PTSD) Sister      Other - See Comments Brother 20         in an MVA         Current Outpatient Medications   Medication Sig Dispense Refill     ciprofloxacin-dexamethasone (CIPRODEX) 0.3-0.1 % otic suspension Place 4 drops Into the left ear 2 times daily 7.5 mL 0     Allergies   Allergen Reactions     Azithromycin Rash     PT NOT SURE       Reviewed and updated as needed this visit by Provider         Review of Systems   ROS COMP: Constitutional, HEENT, cardiovascular, pulmonary, GI, , musculoskeletal, neuro, skin, endocrine and psych systems are negative, except as otherwise noted.      Objective    /78   Pulse 100   Temp 98.4  F (36.9  C) (Temporal)   Resp 16   Wt 69.8 kg (153 lb 12.8 oz)   LMP 2020 (Exact Date)   SpO2 100%   BMI 26.91 kg/m    Body mass index is 26.91 kg/m .  Physical Exam   GENERAL: healthy, alert and no distress  HENT: right ear canal normal with normal TM, left ear canal narrow, and erythematous, pain with tugging on pinna, left TM normal, nose and mouth without ulcers or lesions    Diagnostic Test Results:  Labs reviewed in Epic  none         Assessment & Plan       ICD-10-CM    1. Acute otitis externa of left ear, unspecified type H60.502 ciprofloxacin-dexamethasone (CIPRODEX) 0.3-0.1 % otic suspension     Mandy has had ear pain and fullness for 2 days after using q-tip to scratch her ear. On exam, left ear pain with pulling on pinna, canal swollen and  "erythematous, with normal TM. Will treat her otitis externa with Ciprodex.  Also discussed avoiding using q-tips in her ear to avoid trauma. Recommended to follow up for ear irrigation for wax removal in the future.      BMI:   Estimated body mass index is 26.91 kg/m  as calculated from the following:    Height as of 3/21/19: 1.61 m (5' 3.39\").    Weight as of this encounter: 69.8 kg (153 lb 12.8 oz).   Weight management plan: Patient was referred to their PCP to discuss a diet and exercise plan.    Recommend a flu vaccination but she declined.  Risks and benefit discussed and she is willing to take the risk.    Return in about 3 months (around 4/30/2020) for Physical Exam.    This document serves as a record of the services and decisions personally performed and made by Pj Abreu MD.  It was created on his behalf by Marleni Candelaria, a trained medical student and scribe.  The creation of this record is based on the provider's personal observations and the statements of the patient.     Marleni Candelaria, MS3  January 31, 2020    Pj Acuña Mai, MD  [facility]        "

## 2020-02-03 ASSESSMENT — ANXIETY QUESTIONNAIRES
5. BEING SO RESTLESS THAT IT IS HARD TO SIT STILL: NOT AT ALL
6. BECOMING EASILY ANNOYED OR IRRITABLE: NOT AT ALL
2. NOT BEING ABLE TO STOP OR CONTROL WORRYING: NOT AT ALL
1. FEELING NERVOUS, ANXIOUS, OR ON EDGE: NOT AT ALL
7. FEELING AFRAID AS IF SOMETHING AWFUL MIGHT HAPPEN: NOT AT ALL
3. WORRYING TOO MUCH ABOUT DIFFERENT THINGS: SEVERAL DAYS
IF YOU CHECKED OFF ANY PROBLEMS ON THIS QUESTIONNAIRE, HOW DIFFICULT HAVE THESE PROBLEMS MADE IT FOR YOU TO DO YOUR WORK, TAKE CARE OF THINGS AT HOME, OR GET ALONG WITH OTHER PEOPLE: NOT DIFFICULT AT ALL
GAD7 TOTAL SCORE: 1

## 2020-02-03 ASSESSMENT — PATIENT HEALTH QUESTIONNAIRE - PHQ9
5. POOR APPETITE OR OVEREATING: NOT AT ALL
SUM OF ALL RESPONSES TO PHQ QUESTIONS 1-9: 0

## 2020-02-04 ASSESSMENT — ANXIETY QUESTIONNAIRES: GAD7 TOTAL SCORE: 1

## 2020-07-23 ENCOUNTER — VIRTUAL VISIT (OUTPATIENT)
Dept: FAMILY MEDICINE | Facility: OTHER | Age: 35
End: 2020-07-23

## 2020-07-23 DIAGNOSIS — Z20.822 SUSPECTED 2019 NOVEL CORONAVIRUS INFECTION: Primary | ICD-10-CM

## 2020-07-23 DIAGNOSIS — Z20.822 SUSPECTED 2019 NOVEL CORONAVIRUS INFECTION: ICD-10-CM

## 2020-07-23 PROCEDURE — U0003 INFECTIOUS AGENT DETECTION BY NUCLEIC ACID (DNA OR RNA); SEVERE ACUTE RESPIRATORY SYNDROME CORONAVIRUS 2 (SARS-COV-2) (CORONAVIRUS DISEASE [COVID-19]), AMPLIFIED PROBE TECHNIQUE, MAKING USE OF HIGH THROUGHPUT TECHNOLOGIES AS DESCRIBED BY CMS-2020-01-R: HCPCS | Performed by: FAMILY MEDICINE

## 2020-07-23 NOTE — PROGRESS NOTES
"Date: 2020 09:30:50  Clinician: Tremayne Antony  Clinician NPI: [id number]  Patient: Mandy Montgomery  Patient : 1985  Patient Address: [address] [place], [place] [address] [address]  Patient Phone: [phone]  Visit Protocol: URI  Patient Summary:  Mandy is a 34 year old ( : 1985 ) female who initiated a Visit for cold, sinus infection, or influenza. When asked the question \"Please sign me up to receive news, health information and promotions. \", Mandy responded \"Yes\".    Mandy states her symptoms started suddenly 3-4 days ago.   Her symptoms consist of nausea, tooth pain, anosmia, facial pain or pressure, nasal congestion, malaise, and a headache.   Symptom details     Nasal secretions: The color of her mucus is clear and white.    Facial pain or pressure: The facial pain or pressure feels worse when bending over or leaning forward.     Headache: She states the headache is mild (1-3 on a 10 point pain scale).     Tooth pain: The tooth pain is not caused by a cavity, recent dental work, or other mouth problems.      Mandy denies having wheezing, ageusia, diarrhea, myalgias, sore throat, fever, cough, vomiting, rhinitis, ear pain, and chills. She also denies having recent facial or sinus surgery in the past 60 days, double sickening (worsening symptoms after initial improvement), and taking antibiotic medication in the past month. She is not experiencing dyspnea.    Pertinent COVID-19 (Coronavirus) information  In the past 14 days, Mandy has not worked in a congregate living setting.   She does not work or volunteer as healthcare worker or a  and does not work or volunteer in a healthcare facility.   Mandy also has not lived in a congregate living setting in the past 14 days. She does not live with a healthcare worker.   Mandy has not had a close contact with a laboratory-confirmed COVID-19 patient within 14 days of symptom onset.   Pertinent medical history  Mandy does not get " yeast infections when she takes antibiotics.   Mandy does not need a return to work/school note.   Weight: 145 lbs   Mandy does not smoke or use smokeless tobacco.   She denies pregnancy and denies breastfeeding. She is currently menstruating.   Weight: 145 lbs    MEDICATIONS: Benadryl Allergy oral, cetirizine-pseudoephedrine oral, ALLERGIES: azithromycin  Clinician Response:  Dear Mandy,   Your symptoms show that you may have coronavirus (COVID-19). This illness can cause fever, cough and trouble breathing. Many people get a mild case and get better on their own. Some people can get very sick.  What should I do?  We would like to test you for this virus.   1. Please call [phone] to schedule your visit. Explain that you were referred by [facility] to have a COVID-19 test. Be ready to share your [facility] visit ID number.  The following will serve as your written order for this COVID Test, ordered by me, for the indication of suspected COVID [Z20.828]: The test will be ordered in [employer], our electronic health record, after you are scheduled. It will show as ordered and authorized by Axel Medellin MD.  Order: COVID-19 (Coronavirus) PCR for SYMPTOMATIC testing from [facility].      2. When it's time for your COVID test:  Stay at least 6 feet away from others. (If someone will drive you to your test, stay in the backseat, as far away from the  as you can.)   Cover your mouth and nose with a mask, tissue or washcloth.  Go straight to the testing site. Don't make any stops on the way there or back.      3.Starting now: Stay home and away from others (self-isolate) until:   You've had no fever---and no medicine that reduces fever---for 3 full days (72 hours). And...   Your other symptoms have gotten better. For example, your cough or breathing has improved. And...   At least 10 days have passed since your symptoms started.       During this time, don't leave the house except for testing or medical care.   Stay in your own  "room, even for meals. Use your own bathroom if you can.   Stay away from others in your home. No hugging, kissing or shaking hands. No visitors.  Don't go to work, school or anywhere else.    Clean \"high touch\" surfaces often (doorknobs, counters, handles, etc.). Use a household cleaning spray or wipes. You'll find a full list of  on the EPA website: www.epa.gov/pesticide-registration/list-n-disinfectants-use-against-sars-cov-2.   Cover your mouth and nose with a mask, tissue or washcloth to avoid spreading germs.  Wash your hands and face often. Use soap and water.  Caregivers in these groups are at risk for severe illness due to COVID-19:  o People 65 years and older  o People who live in a nursing home or long-term care facility  o People with chronic disease (lung, heart, cancer, diabetes, kidney, liver, immunologic)  o People who have a weakened immune system, including those who:   Are in cancer treatment  Take medicine that weakens the immune system, such as corticosteroids  Had a bone marrow or organ transplant  Have an immune deficiency  Have poorly controlled HIV or AIDS  Are obese (body mass index of 40 or higher)  Smoke regularly   o Caregivers should wear gloves while washing dishes, handling laundry and cleaning bedrooms and bathrooms.  o Use caution when washing and drying laundry: Don't shake dirty laundry, and use the warmest water setting that you can.  o For more tips, go to www.cdc.gov/coronavirus/2019-ncov/downloads/10Things.pdf.    4.Sign up for [employer]. We know it's scary to hear that you might have COVID-19. We want to track your symptoms to make sure you're okay over the next 2 weeks. Please look for an email from [employer]---this is a free, online program that we'll use to keep in touch. To sign up, follow the link in the email. Learn more at [email or web address]  How can I take care of myself?   Get lots of rest. Drink extra fluids (unless a doctor has told you " not to).   Take Tylenol (acetaminophen) for fever or pain. If you have liver or kidney problems, ask your family doctor if it's okay to take Tylenol.   Adults can take either:    650 mg (two 325 mg pills) every 4 to 6 hours, or...   1,000 mg (two 500 mg pills) every 8 hours as needed.    Note: Don't take more than 3,000 mg in one day. Acetaminophen is found in many medicines (both prescribed and over-the-counter medicines). Read all labels to be sure you don't take too much.   For children, check the Tylenol bottle for the right dose. The dose is based on the child's age or weight.    If you have other health problems (like cancer, heart failure, an organ transplant or severe kidney disease): Call your specialty clinic if you don't feel better in the next 2 days.       Know when to call 911. Emergency warning signs include:    Trouble breathing or shortness of breath Pain or pressure in the chest that doesn't go away Feeling confused like you haven't felt before, or not being able to wake up Bluish-colored lips or face.  Where can I get more information?   [facility] -- About COVID-19: [email or web address]   CDC -- What to Do If You're Sick: www.cdc.gov/coronavirus/2019-ncov/about/steps-when-sick.html   CDC -- Ending Home Isolation: www.cdc.gov/coronavirus/2019-ncov/hcp/disposition-in-home-patients.html   CDC -- Caring for Someone: www.cdc.gov/coronavirus/2019-ncov/if-you-are-sick/care-for-someone.html   [facility] -- Interim Guidance for Hospital Discharge to Home: www.health.[facility].[place]/diseases/coronavirus/hcp/hospdischarge.pdf   [facility] clinical trials (COVID-19 research studies): clinicalaffairs.[facility]/umn-clinical-trials    Below are the COVID-19 hotlines at the [place] Department of Health ([facility]). Interpreters are available.    For health questions: Call 083-298-8383 or 1-487.279.9197 (7 a.m. to 7 p.m.) For questions about schools and childcare: Call 387-851-3036 or  [phone] (7 a.m. to 7 p.m.)    Diagnosis: Other malaise  Diagnosis ICD: R53.81

## 2020-07-25 ENCOUNTER — TELEPHONE (OUTPATIENT)
Dept: URGENT CARE | Facility: URGENT CARE | Age: 35
End: 2020-07-25

## 2020-07-25 LAB
SARS-COV-2 RNA SPEC QL NAA+PROBE: ABNORMAL
SPECIMEN SOURCE: ABNORMAL

## 2020-07-25 NOTE — TELEPHONE ENCOUNTER
"Coronavirus (COVID-19) Notification    Caller Name (Patient, parent, daughter/son, grandparent, etc)  Mandy Trammellrl    Reason for call  Notify of Positive Coronavirus (COVID-19) lab results, assess symptoms,  review  Re[facility] recommendations    Lab Result    Lab test:  2019-nCoV rRt-PCR or SARS-CoV-2 PCR    Oropharyngeal AND/OR nasopharyngeal swabs is POSITIVE for 2019-nCoV RNA/SARS-COV-2 PCR (COVID-19 virus)    RN Recommendations/Instructions per [facility] Coronavirus COVID-19 recommendations    Brief introduction script  Introduce self then review script:  \"I am calling on behalf of [employer].  We were notified that your Coronavirus test (COVID-19) for was POSITIVE for the virus.  I have some information to relay to you but first I wanted to mention that the [place] Dept of Health will be contacting you shortly [it's possible MD already called Patient] to talk to you more about how you are feeling and other people you have had contact with who might now also have the virus.  Also, [facility] is Partnering with the [facility] for Covid-19 research, you may be contacted directly by research staff.\"    Assessment (Inquire about Patient's current symptoms)   Assessment   Current Symptoms at time of phone call: (if no symptoms, document No symptoms] Stuffy, no fever or cough.   Symptoms onset (if applicable) 7/19 started with congestion and loss of smell.     If at time of call, Patients symptoms hare worsened, the Patient should contact 911 or have someone drive them to Emergency Dept promptly:      If Patient calling 911, inform 911 personal that you have tested positive for the Coronavirus (COVID-19).  Place mask on and await 911 to arrive.    If Emergency Dept, If possible, please have another adult drive you to the Emergency Dept but you need to wear mask when in contact with other people.      Review information with Patient    Your result was positive. This means you have COVID-19 " (coronavirus).  We have sent you a letter that reviews the information that I'll be reviewing with you now.    How can I protect others?    If you have symptoms: stay home and away from others (self-isolate) until:    You've had no fever--and no medicine that reduces fever--for 3 full days (72 hours). And      Your other symptoms have gotten better. For example, your cough or breathing has improved. And     At least 10 days have passed since your symptoms started.    If you don't have symptoms: Stay home and away from others (self-isolate) until at least 10 days have passed since your first positive COVID-19 test. (Date test collected)    During this time:    Stay in your own room, including for meals. Use your own bathroom if you can.    Stay away from others in your home. No hugging, kissing or shaking hands. No visitors.     Don't go to work, school or anywhere else.     Clean  high touch  surfaces often (doorknobs, counters, handles, etc.). Use a household cleaning spray or wipes. You'll find a full list on the EPA website at www.epa.gov/pesticide-registration/list-n-disinfectants-use-against-sars-cov-2.     Cover your mouth and nose with a mask, tissue or washcloth to avoid spreading germs.    Wash your hands and face often with soap and water.    Caregivers in these groups are at risk for severe illness due to COVID-19:  o People 65 years and older  o People who live in a nursing home or long-term care facility  o People with chronic disease (lung, heart, cancer, diabetes, kidney, liver, immunologic)  o People who have a weakened immune system, including those who:  - Are in cancer treatment  - Take medicine that weakens the immune system, such as corticosteroids  - Had a bone marrow or organ transplant  - Have an immune deficiency  - Have poorly controlled HIV or AIDS  - Are obese (body mass index of 40 or higher)  - Smoke regularly    Caregivers should wear gloves while washing dishes, handling laundry and  cleaning bedrooms and bathrooms.    Wash and dry laundry with special caution. Don't shake dirty laundry, and use the warmest water setting you can.    If you have a weakened immune system, ask your doctor about other actions you should take.    For more tips, go to www.cdc.gov/coronavirus/2019-ncov/downloads/10Things.pdf.    You should not go back to work until you meet the guidelines above for ending your home isolation. You should meet these along with any other guidelines that your employer has.    Employers: This document serves as formal notice of your employee's medical guidelines for going back to work. They must meet the above guidelines before going back to work in person.    How can I take care of myself?    1. Get lots of rest. Drink extra fluids (unless a doctor has told you not to).    2. Take Tylenol (acetaminophen) for fever or pain. If you have liver or kidney problems, ask your family doctor if it's okay to take Tylenol.     Take either:     650 mg (two 325 mg pills) every 4 to 6 hours, or     1,000 mg (two 500 mg pills) every 8 hours as needed.     Note: Don't take more than 3,000 mg in one day. Acetaminophen is found in many medicines (both prescribed and over-the-counter medicines). Read all labels to be sure you don't take too much.    For children, check the Tylenol bottle for the right dose (based on their age or weight).    3. If you have other health problems (like cancer, heart failure, an organ transplant or severe kidney disease): Call your specialty clinic if you don't feel better in the next 2 days.    4. Know when to call 911: Emergency warning signs include:    Trouble breathing or shortness of breath    Pain or pressure in the chest that doesn't go away    Feeling confused like you haven't felt before, or not being able to wake up    Bluish-colored lips or face    5. Sign up for [employer]. We know it's scary to hear that you have COVID-19. We want to track your symptoms to make  sure you're okay over the next 2 weeks. Please look for an email from [employer]--this is a free, online program that we'll use to keep in touch. To sign up, follow the link in the email. Learn more at [email or web address].    Where can I get more information?    [facility]: [email or web address]    Coronavirus Basics: www.health.[facility].[place]./diseases/coronavirus/basics.html    What to Do If You're Sick: www.cdc.gov/coronavirus/2019-ncov/about/steps-when-sick.html    Ending Home Isolation: www.cdc.gov/coronavirus/2019-ncov/hcp/disposition-in-home-patients.html     Caring for Someone with COVID-19: www.cdc.gov/coronavirus/2019-ncov/if-you-are-sick/care-for-someone.html     [facility] clinical trials (COVID-19 research studies): clinicalaffairs.[facility]-clinical-trials     A Positive COVID-19 letter will be sent via [employer] or the mail.    Batsheva Begum, MSN, RN

## 2020-12-06 ENCOUNTER — HEALTH MAINTENANCE LETTER (OUTPATIENT)
Age: 35
End: 2020-12-06

## 2021-01-05 ENCOUNTER — OFFICE VISIT (OUTPATIENT)
Dept: FAMILY MEDICINE | Facility: CLINIC | Age: 36
End: 2021-01-05
Payer: COMMERCIAL

## 2021-01-05 VITALS
DIASTOLIC BLOOD PRESSURE: 72 MMHG | RESPIRATION RATE: 16 BRPM | HEIGHT: 63 IN | WEIGHT: 147.5 LBS | OXYGEN SATURATION: 99 % | TEMPERATURE: 97.6 F | HEART RATE: 89 BPM | SYSTOLIC BLOOD PRESSURE: 120 MMHG | BODY MASS INDEX: 26.13 KG/M2

## 2021-01-05 DIAGNOSIS — N89.8 VAGINAL DISCHARGE: ICD-10-CM

## 2021-01-05 DIAGNOSIS — Z00.00 ROUTINE GENERAL MEDICAL EXAMINATION AT A HEALTH CARE FACILITY: Primary | ICD-10-CM

## 2021-01-05 DIAGNOSIS — N89.8 VAGINAL ITCHING: ICD-10-CM

## 2021-01-05 LAB
GLUCOSE SERPL-MCNC: 103 MG/DL (ref 70–99)
SPECIMEN SOURCE: NORMAL
WET PREP SPEC: NORMAL

## 2021-01-05 PROCEDURE — 82947 ASSAY GLUCOSE BLOOD QUANT: CPT | Performed by: FAMILY MEDICINE

## 2021-01-05 PROCEDURE — 87624 HPV HI-RISK TYP POOLED RSLT: CPT | Performed by: FAMILY MEDICINE

## 2021-01-05 PROCEDURE — 36415 COLL VENOUS BLD VENIPUNCTURE: CPT | Performed by: FAMILY MEDICINE

## 2021-01-05 PROCEDURE — G0145 SCR C/V CYTO,THINLAYER,RESCR: HCPCS | Performed by: FAMILY MEDICINE

## 2021-01-05 PROCEDURE — 87210 SMEAR WET MOUNT SALINE/INK: CPT | Performed by: FAMILY MEDICINE

## 2021-01-05 PROCEDURE — 99395 PREV VISIT EST AGE 18-39: CPT | Performed by: FAMILY MEDICINE

## 2021-01-05 SDOH — HEALTH STABILITY: MENTAL HEALTH: HOW OFTEN DO YOU HAVE 6 OR MORE DRINKS ON ONE OCCASION?: NOT ASKED

## 2021-01-05 SDOH — HEALTH STABILITY: MENTAL HEALTH: HOW OFTEN DO YOU HAVE A DRINK CONTAINING ALCOHOL?: NOT ASKED

## 2021-01-05 SDOH — HEALTH STABILITY: MENTAL HEALTH: HOW MANY STANDARD DRINKS CONTAINING ALCOHOL DO YOU HAVE ON A TYPICAL DAY?: 1 OR 2

## 2021-01-05 ASSESSMENT — ENCOUNTER SYMPTOMS
HEARTBURN: 1
BREAST MASS: 0
HEADACHES: 0
PARESTHESIAS: 0
CHILLS: 0
JOINT SWELLING: 0
SORE THROAT: 0
FEVER: 0
EYE PAIN: 0
HEMATURIA: 0
COUGH: 0
PALPITATIONS: 0
DIARRHEA: 0
NAUSEA: 0
DIZZINESS: 0
ABDOMINAL PAIN: 0
SHORTNESS OF BREATH: 0
FREQUENCY: 0
DYSURIA: 0
ARTHRALGIAS: 0
MYALGIAS: 0
CONSTIPATION: 0
HEMATOCHEZIA: 0
WEAKNESS: 0
NERVOUS/ANXIOUS: 0

## 2021-01-05 ASSESSMENT — PAIN SCALES - GENERAL: PAINLEVEL: NO PAIN (0)

## 2021-01-05 ASSESSMENT — PATIENT HEALTH QUESTIONNAIRE - PHQ9: SUM OF ALL RESPONSES TO PHQ QUESTIONS 1-9: 0

## 2021-01-05 ASSESSMENT — MIFFLIN-ST. JEOR: SCORE: 1335.01

## 2021-01-05 NOTE — PROGRESS NOTES
SUBJECTIVE:   CC: Mandy Montgomery is an 35 year old woman who presents for preventive health visit.       Patient has been advised of split billing requirements and indicates understanding: Yes  Healthy Habits:     Getting at least 3 servings of Calcium per day:  Yes    Bi-annual eye exam:  Yes    Dental care twice a year:  Yes    Sleep apnea or symptoms of sleep apnea:  None    Diet:  Regular (no restrictions)    Frequency of exercise:  1 day/week    Duration of exercise:  Less than 15 minutes    Taking medications regularly:  Yes    Medication side effects:  None    PHQ-2 Total Score: 0    Additional concerns today:  No              Today's PHQ-2 Score:   PHQ-2 ( 1999 [employer]) 1/5/2021   Q1: Little interest or pleasure in doing things 0   Q2: Feeling down, depressed or hopeless 0   PHQ-2 Score 0   Q1: Little interest or pleasure in doing things Not at all   Q2: Feeling down, depressed or hopeless Not at all   PHQ-2 Score 0       Abuse: Current or Past (Physical, Sexual or Emotional) - No  Do you feel safe in your environment? Yes        Social History     Tobacco Use     Smoking status: Never Smoker     Smokeless tobacco: Never Used   Substance Use Topics     Alcohol use: Yes     Alcohol/week: 7.0 standard drinks     Types: 7 Glasses of wine per week     Drinks per session: 1 or 2     Comment: 1 glass wine daily- most days     If you drink alcohol do you typically have >3 drinks per day or >7 drinks per week? No    Alcohol Use 1/5/2021   Prescreen: >3 drinks/day or >7 drinks/week? No       Reviewed orders with patient.  Reviewed health maintenance and updated orders accordingly - Yes  BP Readings from Last 3 Encounters:   01/05/21 120/72   01/31/20 110/78   01/02/20 123/81    Wt Readings from Last 3 Encounters:   01/05/21 66.9 kg (147 lb 8 oz)   01/31/20 69.8 kg (153 lb 12.8 oz)   08/07/19 68.4 kg (150 lb 12.8 oz)                  Patient Active Problem List   Diagnosis     Mild major depression (H)      CARDIOVASCULAR SCREENING; LDL GOAL LESS THAN 160     GERD (gastroesophageal reflux disease)     Family history of colon cancer     Past Surgical History:   Procedure Laterality Date     COLONOSCOPY      REPEAT AGE 31 AND EVERY 5 YEARS     COLONOSCOPY N/A 2019    Procedure: COLONOSCOPY;  Surgeon: Ricardo Lopez MD;  Location: [facility]     CYSTOSCOPY N/A 2016    Procedure: CYSTOSCOPY;  Surgeon: Eder Evans MD;  Location: [facility]     LAPAROSCOPIC TUBAL LIGATION Bilateral 2016    Procedure: LAPAROSCOPIC TUBAL LIGATION;  Surgeon: Kamaljit Gil MD;  Location: [facility]       Social History     Tobacco Use     Smoking status: Never Smoker     Smokeless tobacco: Never Used   Substance Use Topics     Alcohol use: Yes     Alcohol/week: 7.0 standard drinks     Types: 7 Glasses of wine per week     Drinks per session: 1 or 2     Comment: 1 glass wine daily- most days     Family History   Problem Relation Age of Onset     Cerebrovascular Disease Father         BRAIN ANEURYSM in his early 30s     Genitourinary Problems Father         polycystic kidney disease     Other - See Comments Father 51         in a motorcycle accident     Hypertension Maternal Grandmother      Hypertension Maternal Grandfather      Cancer - colorectal Mother 45     Other - See Comments Sister         older     Post-Traumatic Stress Disorder (PTSD) Sister      Other - See Comments Brother 20         in an MVA         Current Outpatient Medications   Medication Sig Dispense Refill     ciprofloxacin-dexamethasone (CIPRODEX) 0.3-0.1 % otic suspension Place 4 drops Into the left ear 2 times daily 7.5 mL 0       Mammogram not appropriate for this patient based on age.    Pertinent mammograms are reviewed under the imaging tab.  History of abnormal Pap smear: NO - age 30- 65 PAP every 3 years recommended  PAP / HPV Latest Ref Rng & Units 3/21/2019 2016 2011   PAP - NIL NIL NIL   HPV 16 DNA NEG:Negative Negative - -  "  HPV 18 DNA NEG:Negative Negative - -   OTHER HR HPV NEG:Negative Negative - -     Reviewed and updated as needed this visit by clinical staff  Tobacco  Allergies  Meds   Med Hx  Surg Hx  Fam Hx  Soc Hx        Reviewed and updated as needed this visit by Provider                    CONSTITUTIONAL: NEGATIVE for fever, chills, change in weight  INTEGUMENTARY/SKIN: She has a mole she would like checked.  EYES: NEGATIVE for vision changes or irritation  ENT: NEGATIVE for ear, mouth and throat problems  RESP: NEGATIVE for significant cough or SOB  BREAST: NEGATIVE for masses, tenderness or discharge  CV: NEGATIVE for chest pain, palpitations or peripheral edema  GI: NEGATIVE for nausea, abdominal pain, heartburn, or change in bowel habits  : Patient states he has had some vaginal discharge a slight smell to it.. Periods are regular.  MUSCULOSKELETAL: NEGATIVE for significant arthralgias or myalgia  NEURO: NEGATIVE for weakness, dizziness or paresthesias  PSYCHIATRIC: NEGATIVE for changes in mood or affect     OBJECTIVE:   /72   Pulse 89   Temp 97.6  F (36.4  C) (Tympanic)   Resp 16   Ht 1.595 m (5' 2.8\")   Wt 66.9 kg (147 lb 8 oz)   LMP 12/28/2020 (Exact Date)   SpO2 99%   BMI 26.30 kg/m    Physical Exam  GENERAL: healthy, alert and no distress  EYES: Eyes grossly normal to inspection, PERRL and conjunctivae and sclerae normal  HENT: ear canals and TM's normal, nose and mouth without ulcers or lesions  NECK: no adenopathy, no asymmetry, masses, or scars and thyroid normal to palpation  RESP: lungs clear to auscultation - no rales, rhonchi or wheezes  BREAST: normal without masses, tenderness or nipple discharge and no palpable axillary masses or adenopathy  CV: regular rate and rhythm, normal S1 S2, no S3 or S4, no murmur, click or rub, no peripheral edema and peripheral pulses strong  ABDOMEN: soft, nontender, no hepatosplenomegaly, no masses and bowel sounds normal   (female): normal female " "external genitalia, normal urethral meatus, vaginal mucosa, normal cervix/adnexa/uterus without masses or discharge  MS: no gross musculoskeletal defects noted, no edema  SKIN: Patient has a couple of small moles less than half a centimeter in size but do have some darkish discoloration I do think they warrant biopsy or shave excision.    NEURO: Normal strength and tone, mentation intact and speech normal  PSYCH: mentation appears normal, affect normal/bright  LYMPH: no cervical, supraclavicular, axillary, or inguinal adenopathy    Diagnostic Test Results:  Labs reviewed in Epic  Results for orders placed or performed in visit on 01/05/21 (from the past 24 hour(s))   Wet prep    Specimen: Vagina   Result Value Ref Range    Specimen Description Vagina     Wet Prep Moderate  PMNs seen       Wet Prep No clue cells seen     Wet Prep No Trichomonas seen     Wet Prep No yeast seen    Glucose   Result Value Ref Range    Glucose 103 (H) 70 - 99 mg/dL       ASSESSMENT/PLAN:   1. Routine general medical examination at a health care facility  Borderline glucose she will watch her carbohydrate intake eat a balanced diet work on daily exercise recheck it again in 1 year.  Is a family history of diabetes.  - Glucose  - Pap imaged thin layer screen with HPV - recommended age 30 - 65 years (select HPV order below)    2. Vaginal discharge  Are going to try vinegar and water douche every 2 months to see if we can eliminate the overgrowth of bacteria.  Get her vaginal pH back to normal.  - Wet prep    3. Vaginal itching  As above  - Wet prep    Patient has been advised of split billing requirements and indicates understanding: Yes  COUNSELING:  Reviewed preventive health counseling, as reflected in patient instructions       Regular exercise       Healthy diet/nutrition    Estimated body mass index is 26.3 kg/m  as calculated from the following:    Height as of this encounter: 1.595 m (5' 2.8\").    Weight as of this encounter: 66.9 kg " (147 lb 8 oz).        She reports that she has never smoked. She has never used smokeless tobacco.      Counseling Resources:  ATP IV Guidelines  Pooled Cohorts Equation Calculator  Breast Cancer Risk Calculator  BRCA-Related Cancer Risk Assessment: FHS-7 Tool  FRAX Risk Assessment  ICSI Preventive Guidelines  Dietary Guidelines for Americans, 2010  USDA's MyPlate  ASA Prophylaxis  Lung CA Screening    Kamaljit Gil MD  [facility]

## 2021-01-07 LAB
COPATH REPORT: NORMAL
PAP: NORMAL

## 2021-01-14 ENCOUNTER — MYC MEDICAL ADVICE (OUTPATIENT)
Dept: FAMILY MEDICINE | Facility: CLINIC | Age: 36
End: 2021-01-14

## 2021-01-18 ENCOUNTER — MYC MEDICAL ADVICE (OUTPATIENT)
Dept: FAMILY MEDICINE | Facility: CLINIC | Age: 36
End: 2021-01-18

## 2021-01-18 NOTE — TELEPHONE ENCOUNTER
Per RF- she needs an appt for a consult. Will mychart her back.  Lucia Menjivar, [facility]

## 2021-01-19 DIAGNOSIS — N76.0 BV (BACTERIAL VAGINOSIS): Primary | ICD-10-CM

## 2021-01-19 DIAGNOSIS — B96.89 BV (BACTERIAL VAGINOSIS): Primary | ICD-10-CM

## 2021-01-19 RX ORDER — FLUCONAZOLE 150 MG/1
150 TABLET ORAL ONCE
Qty: 1 TABLET | Refills: 0 | Status: SHIPPED | OUTPATIENT
Start: 2021-01-19 | End: 2021-01-19

## 2021-01-19 RX ORDER — METRONIDAZOLE 500 MG/1
500 TABLET ORAL 2 TIMES DAILY
Qty: 14 TABLET | Refills: 0 | Status: SHIPPED | OUTPATIENT
Start: 2021-01-19 | End: 2021-01-26

## 2021-01-19 NOTE — TELEPHONE ENCOUNTER
Per RF- we can do this on the same day as long as her insurance company agrees to that. She will need to check with them. Will mychart her back.  Lucia Menjivar, [facility]

## 2021-02-28 ENCOUNTER — APPOINTMENT (OUTPATIENT)
Dept: URGENT CARE | Facility: CLINIC | Age: 36
End: 2021-02-28
Payer: COMMERCIAL

## 2021-08-31 ENCOUNTER — TRANSFERRED RECORDS (OUTPATIENT)
Dept: HEALTH INFORMATION MANAGEMENT | Facility: CLINIC | Age: 36
End: 2021-08-31

## 2021-08-31 ENCOUNTER — OFFICE VISIT (OUTPATIENT)
Dept: INTERNAL MEDICINE | Facility: CLINIC | Age: 36
End: 2021-08-31
Payer: COMMERCIAL

## 2021-08-31 VITALS
WEIGHT: 139 LBS | DIASTOLIC BLOOD PRESSURE: 74 MMHG | BODY MASS INDEX: 24.78 KG/M2 | SYSTOLIC BLOOD PRESSURE: 128 MMHG | HEART RATE: 100 BPM | OXYGEN SATURATION: 98 % | RESPIRATION RATE: 16 BRPM | TEMPERATURE: 98.2 F

## 2021-08-31 DIAGNOSIS — R07.9 CHEST PAIN, UNSPECIFIED TYPE: ICD-10-CM

## 2021-08-31 DIAGNOSIS — F43.23 ADJUSTMENT DISORDER WITH MIXED ANXIETY AND DEPRESSED MOOD: Primary | ICD-10-CM

## 2021-08-31 DIAGNOSIS — K21.00 GASTROESOPHAGEAL REFLUX DISEASE WITH ESOPHAGITIS, UNSPECIFIED WHETHER HEMORRHAGE: ICD-10-CM

## 2021-08-31 PROCEDURE — 93000 ELECTROCARDIOGRAM COMPLETE: CPT | Performed by: INTERNAL MEDICINE

## 2021-08-31 PROCEDURE — 99214 OFFICE O/P EST MOD 30 MIN: CPT | Performed by: INTERNAL MEDICINE

## 2021-08-31 RX ORDER — CITALOPRAM HYDROBROMIDE 10 MG/1
10 TABLET ORAL DAILY
Qty: 30 TABLET | Refills: 5 | Status: SHIPPED | OUTPATIENT
Start: 2021-08-31 | End: 2022-04-05

## 2021-08-31 RX ORDER — NAPROXEN 500 MG/1
TABLET ORAL
COMMUNITY
Start: 2021-08-29 | End: 2021-09-28

## 2021-08-31 RX ORDER — OMEPRAZOLE 20 MG/1
20 TABLET, DELAYED RELEASE ORAL DAILY
Qty: 14 TABLET | Refills: 0 | Status: SHIPPED | OUTPATIENT
Start: 2021-08-31 | End: 2021-09-28

## 2021-08-31 ASSESSMENT — ANXIETY QUESTIONNAIRES
3. WORRYING TOO MUCH ABOUT DIFFERENT THINGS: NEARLY EVERY DAY
2. NOT BEING ABLE TO STOP OR CONTROL WORRYING: NEARLY EVERY DAY
6. BECOMING EASILY ANNOYED OR IRRITABLE: MORE THAN HALF THE DAYS
5. BEING SO RESTLESS THAT IT IS HARD TO SIT STILL: SEVERAL DAYS
GAD7 TOTAL SCORE: 17
1. FEELING NERVOUS, ANXIOUS, OR ON EDGE: NEARLY EVERY DAY
IF YOU CHECKED OFF ANY PROBLEMS ON THIS QUESTIONNAIRE, HOW DIFFICULT HAVE THESE PROBLEMS MADE IT FOR YOU TO DO YOUR WORK, TAKE CARE OF THINGS AT HOME, OR GET ALONG WITH OTHER PEOPLE: VERY DIFFICULT
7. FEELING AFRAID AS IF SOMETHING AWFUL MIGHT HAPPEN: NEARLY EVERY DAY

## 2021-08-31 ASSESSMENT — PAIN SCALES - GENERAL: PAINLEVEL: NO PAIN (0)

## 2021-08-31 ASSESSMENT — PATIENT HEALTH QUESTIONNAIRE - PHQ9
SUM OF ALL RESPONSES TO PHQ QUESTIONS 1-9: 10
5. POOR APPETITE OR OVEREATING: MORE THAN HALF THE DAYS

## 2021-08-31 NOTE — PROGRESS NOTES
"    Assessment & Plan     Adjustment disorder with mixed anxiety and depressed mood  Patient here with depression and anxiety. She had more stressed because her brother and father  in August. She just had and the cousin . Her son has been sick. Her scores for the soo and PHQ-9 are elevated we will start her on Celexa and do a mental health referral for counseling.  - MENTAL HEALTH REFERRAL  - Adult; Outpatient Treatment; Individual/Couples/Family/Group Therapy/Health Psychology; [facility] [phone]; We will contact you to schedule the appointment or please call with any questions; Future  - citalopram (CELEXA) 10 MG tablet; Take 1 tablet (10 mg) by mouth daily    Gastroesophageal reflux disease with esophagitis, unspecified whether hemorrhage  Some heartburn and reflux disease which could be causing her chest pain she has had some ibuprofen and naproxen use lately she will stop those. She is already given up caffeine. We will put her on omeprazole for 2 weeks.  - omeprazole (PRILOSEC OTC) 20 MG EC tablet; Take 1 tablet (20 mg) by mouth daily    Chest pain, unspecified type  Chest pain no clear cause but do not think it is cardiac. We will check an EKG today. His chest pain is very short-lived for 20-30 seconds and not happening with exertion. Probably from her stress and anxiety. Could be from reflux and esophageal spasm as well.  - EKG 12-lead complete w/read - Clinics             BMI:   Estimated body mass index is 24.78 kg/m  as calculated from the following:    Height as of 21: 1.595 m (5' 2.8\").    Weight as of this encounter: 63 kg (139 lb).           Return in about 4 weeks (around 2021) for If not better with this diagnosis.    Jaime Costa MD  [facility] JEM Galvan is a 36 year old who presents for the following health issues     HPI     Chief Complaint   Patient presents with     Chest Pain     experiencing episodes of chest pain " since 8/15/21, also occassional lightheadedness     , 4 kids, works at [employer].  Naprosyn for knee bursitis and is helping.  Just started yesterday, was taking ibuprofen.      Chest pain started on August 15th, no injury.  More stress. Lost her dad and brother in August so lots of bad memories.  Youngest son had health issues.      Pain above the breasts on both sides, some left axillary area, 30 seconds then goes a way.  Can happen at rest or with exertion, doesn't stop exertion.      Past Medical History:   Diagnosis Date     History of anxiety      Irregular menstrual cycle      Other seborrheic dermatitis      Pain in joint, site unspecified      Current Outpatient Medications   Medication     citalopram (CELEXA) 10 MG tablet     naproxen (NAPROSYN) 500 MG tablet     omeprazole (PRILOSEC OTC) 20 MG EC tablet     No current facility-administered medications for this visit.     Social History     Tobacco Use     Smoking status: Never Smoker     Smokeless tobacco: Never Used   Substance Use Topics     Alcohol use: Yes     Alcohol/week: 7.0 standard drinks     Types: 7 Glasses of wine per week     Comment: 1 glass wine daily- most days     Drug use: No       Review of Systems       Objective    /74   Pulse 100   Temp 98.2  F (36.8  C) (Temporal)   Resp 16   Wt 63 kg (139 lb)   SpO2 98%   BMI 24.78 kg/m    Body mass index is 24.78 kg/m .  Physical Exam   No acute distress  Her heart is regular  Lungs are clear  Chest is no palpable pain over the sternum or in the upper chest area.  EKG is pending. NSR, no st changes.         Elevate MARY and phq 9 scores, not suicidal.

## 2021-09-01 ASSESSMENT — ANXIETY QUESTIONNAIRES: GAD7 TOTAL SCORE: 17

## 2021-09-26 ENCOUNTER — HEALTH MAINTENANCE LETTER (OUTPATIENT)
Age: 36
End: 2021-09-26

## 2021-09-28 ENCOUNTER — OFFICE VISIT (OUTPATIENT)
Dept: INTERNAL MEDICINE | Facility: CLINIC | Age: 36
End: 2021-09-28
Payer: COMMERCIAL

## 2021-09-28 VITALS
BODY MASS INDEX: 24.78 KG/M2 | HEART RATE: 78 BPM | RESPIRATION RATE: 12 BRPM | WEIGHT: 139 LBS | OXYGEN SATURATION: 100 % | SYSTOLIC BLOOD PRESSURE: 122 MMHG | TEMPERATURE: 98.1 F | DIASTOLIC BLOOD PRESSURE: 84 MMHG

## 2021-09-28 DIAGNOSIS — R07.9 CHEST PAIN, UNSPECIFIED TYPE: Primary | ICD-10-CM

## 2021-09-28 DIAGNOSIS — F43.23 ADJUSTMENT DISORDER WITH MIXED ANXIETY AND DEPRESSED MOOD: ICD-10-CM

## 2021-09-28 DIAGNOSIS — R73.09 ELEVATED GLUCOSE: ICD-10-CM

## 2021-09-28 PROCEDURE — 99214 OFFICE O/P EST MOD 30 MIN: CPT | Performed by: INTERNAL MEDICINE

## 2021-09-28 ASSESSMENT — PAIN SCALES - GENERAL: PAINLEVEL: NO PAIN (0)

## 2021-09-28 NOTE — PROGRESS NOTES
"    Assessment & Plan     Chest pain, unspecified type  Post the visit was discussing her chest pain.  She really has low risk factors with her age, female, non-smoker, good blood pressure.  She does have family history with some concerns.  Recommended she try exercising if she has any symptoms let us know we will hold off on any stress test at this time.  Her history seems to show more of a chest wall pain and irritation.    Adjustment disorder with mixed anxiety and depressed mood  Anxiety and depression the patient did not start the Celexa we prescribed last time she still having some anxiety she agrees to try this for a couple months to see if it will help stabilize her mood and balance things out.    Elevated glucose in the past glucose was 120 and then 103.  I recommended she get a fasting glucose her comprehensive panel every year at her physical.  She does not qualify or meet the criteria for diabetes at this time she has some family history of this but she is low risk with her BMI of 24.               BMI:   Estimated body mass index is 24.78 kg/m  as calculated from the following:    Height as of 1/5/21: 1.595 m (5' 2.8\").    Weight as of this encounter: 63 kg (139 lb).           No follow-ups on file.    Jaime Costa MD  [facility] JEM Galvan is a 36 year old who presents for the following health issues     HPI     Chief Complaint   Patient presents with     Chest Pain     f/u - still having chest pain, reflux med seems like it's not working     Feeling some extra heartbeats. Some chest pains still. Happens everyday, both sides, not related to exertion.  Short lived with 30-60 seconds.       Took 14 days of Omeprazole and no difference.   No taking ibuprofen, cut out caffeine completely.    Family history of diabetes    Worries about things, some anxiety.      Past Medical History:   Diagnosis Date     History of anxiety      Irregular menstrual cycle      Other " seborrheic dermatitis      Pain in joint, site unspecified      Current Outpatient Medications   Medication     citalopram (CELEXA) 10 MG tablet     No current facility-administered medications for this visit.     Social History     Tobacco Use     Smoking status: Never Smoker     Smokeless tobacco: Never Used   Substance Use Topics     Alcohol use: Yes     Alcohol/week: 7.0 standard drinks     Types: 7 Glasses of wine per week     Comment: 1 glass wine daily- most days     Drug use: No       Review of Systems         Objective    /84   Pulse 78   Temp 98.1  F (36.7  C) (Temporal)   Resp 12   Wt 63 kg (139 lb)   SpO2 100%   BMI 24.78 kg/m    Body mass index is 24.78 kg/m .  Physical Exam   No acute distress  Heart is regular  Lungs are clear  Mild tenderness bilaterally under the collarbone on the ribs      Reviewed her EKG is perfectly normal from the last visit.

## 2022-01-21 ENCOUNTER — OFFICE VISIT (OUTPATIENT)
Dept: FAMILY MEDICINE | Facility: CLINIC | Age: 37
End: 2022-01-21
Payer: COMMERCIAL

## 2022-01-21 VITALS
DIASTOLIC BLOOD PRESSURE: 76 MMHG | OXYGEN SATURATION: 100 % | SYSTOLIC BLOOD PRESSURE: 124 MMHG | BODY MASS INDEX: 24.25 KG/M2 | TEMPERATURE: 98.3 F | WEIGHT: 136 LBS | HEART RATE: 97 BPM

## 2022-01-21 DIAGNOSIS — R10.9 ACUTE LEFT FLANK PAIN: Primary | ICD-10-CM

## 2022-01-21 DIAGNOSIS — M54.6 ACUTE BILATERAL THORACIC BACK PAIN: ICD-10-CM

## 2022-01-21 LAB
ALBUMIN UR-MCNC: NEGATIVE MG/DL
ANION GAP SERPL CALCULATED.3IONS-SCNC: 3 MMOL/L (ref 3–14)
APPEARANCE UR: CLEAR
BACTERIA #/AREA URNS HPF: ABNORMAL /HPF
BILIRUB UR QL STRIP: NEGATIVE
BUN SERPL-MCNC: 15 MG/DL (ref 7–30)
CALCIUM SERPL-MCNC: 8.8 MG/DL (ref 8.5–10.1)
CHLORIDE BLD-SCNC: 108 MMOL/L (ref 94–109)
CO2 SERPL-SCNC: 27 MMOL/L (ref 20–32)
COLOR UR AUTO: ABNORMAL
CREAT SERPL-MCNC: 0.71 MG/DL (ref 0.52–1.04)
GFR SERPL CREATININE-BSD FRML MDRD: >90 ML/MIN/1.73M2
GLUCOSE BLD-MCNC: 102 MG/DL (ref 70–99)
GLUCOSE UR STRIP-MCNC: NEGATIVE MG/DL
HGB UR QL STRIP: NEGATIVE
KETONES UR STRIP-MCNC: NEGATIVE MG/DL
LEUKOCYTE ESTERASE UR QL STRIP: ABNORMAL
NITRATE UR QL: NEGATIVE
PH UR STRIP: 7 [PH] (ref 5–7)
POTASSIUM BLD-SCNC: 4.1 MMOL/L (ref 3.4–5.3)
RBC URINE: 0 /HPF
SODIUM SERPL-SCNC: 138 MMOL/L (ref 133–144)
SP GR UR STRIP: 1.01 (ref 1–1.03)
SQUAMOUS EPITHELIAL: 3 /HPF
UROBILINOGEN UR STRIP-MCNC: NORMAL MG/DL
WBC URINE: 2 /HPF

## 2022-01-21 PROCEDURE — 99214 OFFICE O/P EST MOD 30 MIN: CPT | Performed by: NURSE PRACTITIONER

## 2022-01-21 PROCEDURE — 80048 BASIC METABOLIC PNL TOTAL CA: CPT | Performed by: NURSE PRACTITIONER

## 2022-01-21 PROCEDURE — 81001 URINALYSIS AUTO W/SCOPE: CPT | Performed by: NURSE PRACTITIONER

## 2022-01-21 PROCEDURE — 36415 COLL VENOUS BLD VENIPUNCTURE: CPT | Performed by: NURSE PRACTITIONER

## 2022-01-21 ASSESSMENT — PAIN SCALES - GENERAL: PAINLEVEL: MILD PAIN (3)

## 2022-01-21 NOTE — PATIENT INSTRUCTIONS
Please increase fluids, warm pack to left back area. Tylenol or ibuprofen for pain.     I will my chart you with your lab results.     Return to clinic if symptoms not improved in 2-4 weeks.     Thank you  Melanie Gusman CNP

## 2022-01-21 NOTE — PROGRESS NOTES
Assessment & Plan     Acute left flank pain  \  - UA Macro with Reflex to Micro and Culture - lab collect  - Basic metabolic panel  (Ca, Cl, CO2, Creat, Gluc, K, Na, BUN)    Acute bilateral thoracic back pain  \  - UA Macro with Reflex to Micro and Culture - lab collect  - Basic metabolic panel  (Ca, Cl, CO2, Creat, Gluc, K, Na, BUN)      Negative assessment today we will check urine for blood possible renal stone that has passed.  It is also possible renal cyst has increased in size.  However symptoms most likely related to muscle skeletal would like her to rest, increase her fluid intake and use Tylenol or ibuprofen for discomfort patient verbalized understanding       Patient Instructions   Please increase fluids, warm pack to left back area. Tylenol or ibuprofen for pain.     I will my chart you with your lab results.     Return to clinic if symptoms not improved in 2-4 weeks.     Thank you  Melanie Gusman CNP        No follow-ups on file.    FRANCISCO Holcomb CNP  [facility] JEM Galvan is a 36 year old who presents for the following health issues     HPI     Pain History:  When did you first notice your pain? - Less than 1 week   Have you seen anyone else for your pain? No  Where in your body do you have pain? Abdominal/Flank Pain  Onset/Duration: Started Sunday   Description:   Character: Dull ache, sometimes sharp  Location: right flank left flank  Radiation: None and Back  Intensity: moderate  Progression of Symptoms:  same  Accompanying Signs & Symptoms:  Fever/Chills: no  Gas/Bloating: no  Nausea: no  Vomitting: no  Diarrhea: no  Constipation: no  Dysuria or Hematuria: no  History:   Trauma: no  Previous similar pain: YES- in the past  Previous tests done: none  Precipitating factors:   Does the pain change with:     Food: no    Bowel Movement: no    Urination: no   Other factors:  YES- moving  Therapies tried and outcome: None  No LMP recorded.    Patient states  she is having mainly right flank pain that is consistent but not severe. She states she had been on a long road trip driving feels possible back pain. She denies dysuria, pain improves with motion.   History of small right renal cyst per imaging from 2019.  She does work at [employer] states that she drinks a lot of caffeine not a lot of water is very active at work     Review of Systems   Constitutional, HEENT, cardiovascular, pulmonary, GI, , musculoskeletal, neuro, skin, endocrine and psych systems are negative, except as otherwise noted.      Objective    /76   Pulse 97   Temp 98.3  F (36.8  C) (Temporal)   Wt 61.7 kg (136 lb)   SpO2 100%   BMI 24.25 kg/m    Body mass index is 24.25 kg/m .  Physical Exam   GENERAL: healthy, alert and no distress  EYES: Eyes grossly normal to inspection, PERRL and conjunctivae and sclerae normal  HENT: ear canals and TM's normal, nose and mouth without ulcers or lesions  NECK: no adenopathy, no asymmetry, masses, or scars and thyroid normal to palpation  RESP: lungs clear to auscultation - no rales, rhonchi or wheezes  BREAST: normal without masses, tenderness or nipple discharge and no palpable axillary masses or adenopathy  CV: regular rate and rhythm, normal S1 S2, no S3 or S4, no murmur, click or rub, no peripheral edema and peripheral pulses strong  ABDOMEN: soft, nontender, no hepatosplenomegaly, no masses and bowel sounds normal  MS: no gross musculoskeletal defects noted, no edema  SKIN: no suspicious lesions or rashes  NEURO: Normal strength and tone, mentation intact and speech normal  PSYCH: mentation appears normal, affect normal/bright

## 2022-03-12 ENCOUNTER — HEALTH MAINTENANCE LETTER (OUTPATIENT)
Age: 37
End: 2022-03-12

## 2022-04-05 ENCOUNTER — OFFICE VISIT (OUTPATIENT)
Dept: FAMILY MEDICINE | Facility: CLINIC | Age: 37
End: 2022-04-05
Payer: COMMERCIAL

## 2022-04-05 VITALS
DIASTOLIC BLOOD PRESSURE: 78 MMHG | WEIGHT: 138 LBS | OXYGEN SATURATION: 100 % | BODY MASS INDEX: 24.45 KG/M2 | SYSTOLIC BLOOD PRESSURE: 124 MMHG | HEART RATE: 104 BPM | RESPIRATION RATE: 18 BRPM | TEMPERATURE: 98.9 F | HEIGHT: 63 IN

## 2022-04-05 DIAGNOSIS — F41.1 GAD (GENERALIZED ANXIETY DISORDER): ICD-10-CM

## 2022-04-05 DIAGNOSIS — H53.9 VISION CHANGES: Primary | ICD-10-CM

## 2022-04-05 DIAGNOSIS — Z13.6 CARDIOVASCULAR SCREENING; LDL GOAL LESS THAN 130: ICD-10-CM

## 2022-04-05 LAB
ANION GAP SERPL CALCULATED.3IONS-SCNC: 4 MMOL/L (ref 3–14)
BUN SERPL-MCNC: 15 MG/DL (ref 7–30)
CALCIUM SERPL-MCNC: 8.6 MG/DL (ref 8.5–10.1)
CHLORIDE BLD-SCNC: 110 MMOL/L (ref 94–109)
CHOLEST SERPL-MCNC: 138 MG/DL
CO2 SERPL-SCNC: 26 MMOL/L (ref 20–32)
CREAT SERPL-MCNC: 0.76 MG/DL (ref 0.52–1.04)
FASTING STATUS PATIENT QL REPORTED: ABNORMAL
FASTING STATUS PATIENT QL REPORTED: ABNORMAL
GFR SERPL CREATININE-BSD FRML MDRD: >90 ML/MIN/1.73M2
GLUCOSE BLD-MCNC: 102 MG/DL (ref 70–99)
GLUCOSE BLD-MCNC: 102 MG/DL (ref 70–99)
HBA1C MFR BLD: 5.1 % (ref 0–5.6)
HDLC SERPL-MCNC: 43 MG/DL
LDLC SERPL CALC-MCNC: 76 MG/DL
NONHDLC SERPL-MCNC: 95 MG/DL
POTASSIUM BLD-SCNC: 3.8 MMOL/L (ref 3.4–5.3)
SODIUM SERPL-SCNC: 140 MMOL/L (ref 133–144)
TRIGL SERPL-MCNC: 95 MG/DL

## 2022-04-05 PROCEDURE — 80061 LIPID PANEL: CPT | Performed by: FAMILY MEDICINE

## 2022-04-05 PROCEDURE — 80048 BASIC METABOLIC PNL TOTAL CA: CPT | Performed by: FAMILY MEDICINE

## 2022-04-05 PROCEDURE — 83036 HEMOGLOBIN GLYCOSYLATED A1C: CPT | Performed by: FAMILY MEDICINE

## 2022-04-05 PROCEDURE — 36415 COLL VENOUS BLD VENIPUNCTURE: CPT | Performed by: FAMILY MEDICINE

## 2022-04-05 PROCEDURE — 99214 OFFICE O/P EST MOD 30 MIN: CPT | Performed by: FAMILY MEDICINE

## 2022-04-05 RX ORDER — NAPROXEN 500 MG/1
TABLET ORAL
COMMUNITY
End: 2023-11-16

## 2022-04-05 ASSESSMENT — PATIENT HEALTH QUESTIONNAIRE - PHQ9
SUM OF ALL RESPONSES TO PHQ QUESTIONS 1-9: 2
10. IF YOU CHECKED OFF ANY PROBLEMS, HOW DIFFICULT HAVE THESE PROBLEMS MADE IT FOR YOU TO DO YOUR WORK, TAKE CARE OF THINGS AT HOME, OR GET ALONG WITH OTHER PEOPLE: NOT DIFFICULT AT ALL
SUM OF ALL RESPONSES TO PHQ QUESTIONS 1-9: 2

## 2022-04-05 ASSESSMENT — PAIN SCALES - GENERAL: PAINLEVEL: MILD PAIN (2)

## 2022-04-05 NOTE — PROGRESS NOTES
Assessment & Plan     Vision changes  All of the patient's laboratory studies are normal she does have a follow-up with ophthalmology for yearly eye exam.  I do feel a lot of this is her anxiety.  - Glucose; Future  - Hemoglobin A1c (aka HBA1C); Future  - Basic metabolic panel  (Ca, Cl, CO2, Creat, Gluc, K, Na, BUN); Future  - Glucose  - Hemoglobin A1c (aka HBA1C)  - Basic metabolic panel  (Ca, Cl, CO2, Creat, Gluc, K, Na, BUN)    CARDIOVASCULAR SCREENING; LDL GOAL LESS THAN 130    - Lipid panel reflex to direct LDL Fasting; Future  - Lipid panel reflex to direct LDL Fasting    MARY (generalized anxiety disorder)  Is not interested in going on medications for anxiety at this time but I think that would be the next step getting into some counseling and dealing with her anxiety which I think is affecting her physical health.      Kamaljit Gil MD  [facility] JEM Galvan is a 36 year old who presents for the following health issues     History of Present Illness       Mental Health Follow-up:                    Today's PHQ-9         PHQ-9 Total Score: 2  PHQ-9 Q9 Thoughts of better off dead/self-harm past 2 weeks :   (P) Not at all    How difficult have these problems made it for you to do your work, take care of things at home, or get along with other people: Not difficult at all        Diabetes:   She presents for follow up of diabetes.  She is not checking blood glucose. She is concerned about other. She is having excessive thirst and blurry vision.         Hyperlipidemia:  She presents for follow up of hyperlipidemia.  She is not taking medication to lower cholesterol. She is having myalgia or other side effects to statin medications.    Hypertension: She presents for follow up of hypertension.  She does not check blood pressure  regularly outside of the clinic. Outpatient blood pressures have not been over 140/90. She does not follow a low salt diet.     Reason for visit:   Light headed, ear ache  Symptom onset:  1-2 weeks ago  Symptoms include:  Light headed, ear ache, sometimes chest pain  Symptom intensity:  Mild  Symptom progression:  Worsening  Had these symptoms before:  No  What makes it worse:  Squatting and getting up to fast..sudden movements make me feel dizzy, ear ache through out the day in both ears. Some times I have chest pain.  What makes it better:  No    She eats 2-3 servings of fruits and vegetables daily.She consumes 1 sweetened beverage(s) daily.She exercises with enough effort to increase her heart rate 9 or less minutes per day.  She exercises with enough effort to increase her heart rate 3 or less days per week.   She is taking medications regularly.     Patient has significant anxiety and is worried about some intermittent blurred vision lasts only a few seconds she will blink and it will clear.  She seems to be a small spot one eye or the other there is no rhyme or reason to when it happens or when it does not happen.  She has been having more more problems with anxiety over the past few months lots of stress in the family in regards to her  who has uncontrolled anxiety and depression and a son who is going to school in [facility] and she is in a custody sandoval with her  over her son.  There is stress about where he is going to be going to school next year to finish his senior year they live in [place] and her son still wants to finish in [place] were all his friends are.  She is worried about him driving back and forth each day.  She is worried about possible diabetes possible hypertension because her father had problems with these.  We did talk about these at length we talked about anxiety and things it can cause.  She is scheduled for a eye exam in the near future for her visual issues.            Review of Systems   Constitutional, HEENT, cardiovascular, pulmonary, gi and gu systems are negative, except as otherwise noted.    "   Objective    /78   Pulse 104   Temp 98.9  F (37.2  C) (Temporal)   Resp 18   Ht 1.595 m (5' 2.8\")   Wt 62.6 kg (138 lb)   SpO2 100%   BMI 24.60 kg/m    Body mass index is 24.6 kg/m .  Physical Exam   GENERAL: healthy, alert and no distress  EYES: Eyes grossly normal to inspection, PERRL and conjunctivae and sclerae normal since retina were visualized optic disks are normal vascular normal  NEURO: Normal strength and tone, mentation intact and speech normal    Results for orders placed or performed in visit on 04/05/22 (from the past 24 hour(s))   Glucose   Result Value Ref Range    Glucose 102 (H) 70 - 99 mg/dL    Patient Fasting > 8hrs? Unknown    Lipid panel reflex to direct LDL Fasting   Result Value Ref Range    Cholesterol 138 <200 mg/dL    Triglycerides 95 <150 mg/dL    Direct Measure HDL 43 (L) >=50 mg/dL    LDL Cholesterol Calculated 76 <=100 mg/dL    Non HDL Cholesterol 95 <130 mg/dL    Patient Fasting > 8hrs? Unknown     Narrative    Cholesterol  Desirable:  <200 mg/dL    Triglycerides  Normal:  Less than 150 mg/dL  Borderline High:  150-199 mg/dL  High:  200-499 mg/dL  Very High:  Greater than or equal to 500 mg/dL    Direct Measure HDL  Female:  Greater than or equal to 50 mg/dL   Male:  Greater than or equal to 40 mg/dL    LDL Cholesterol  Desirable:  <100mg/dL  Above Desirable:  100-129 mg/dL   Borderline High:  130-159 mg/dL   High:  160-189 mg/dL   Very High:  >= 190 mg/dL    Non HDL Cholesterol  Desirable:  130 mg/dL  Above Desirable:  130-159 mg/dL  Borderline High:  160-189 mg/dL  High:  190-219 mg/dL  Very High:  Greater than or equal to 220 mg/dL   Hemoglobin A1c (aka HBA1C)   Result Value Ref Range    Hemoglobin A1C 5.1 0.0 - 5.6 %   Basic metabolic panel  (Ca, Cl, CO2, Creat, Gluc, K, Na, BUN)   Result Value Ref Range    Sodium 140 133 - 144 mmol/L    Potassium 3.8 3.4 - 5.3 mmol/L    Chloride 110 (H) 94 - 109 mmol/L    Carbon Dioxide (CO2) 26 20 - 32 mmol/L    Anion Gap 4 3 - " 14 mmol/L    Urea Nitrogen 15 7 - 30 mg/dL    Creatinine 0.76 0.52 - 1.04 mg/dL    Calcium 8.6 8.5 - 10.1 mg/dL    Glucose 102 (H) 70 - 99 mg/dL    GFR Estimate >90 >60 mL/min/1.73m2

## 2022-04-06 ASSESSMENT — PATIENT HEALTH QUESTIONNAIRE - PHQ9: SUM OF ALL RESPONSES TO PHQ QUESTIONS 1-9: 2

## 2022-06-15 ENCOUNTER — OFFICE VISIT (OUTPATIENT)
Dept: FAMILY MEDICINE | Facility: CLINIC | Age: 37
End: 2022-06-15
Payer: COMMERCIAL

## 2022-06-15 VITALS
HEART RATE: 88 BPM | OXYGEN SATURATION: 100 % | RESPIRATION RATE: 16 BRPM | TEMPERATURE: 98 F | BODY MASS INDEX: 24.42 KG/M2 | SYSTOLIC BLOOD PRESSURE: 112 MMHG | WEIGHT: 137 LBS | DIASTOLIC BLOOD PRESSURE: 68 MMHG

## 2022-06-15 DIAGNOSIS — R20.2 NUMBNESS AND TINGLING IN LEFT ARM: Primary | ICD-10-CM

## 2022-06-15 DIAGNOSIS — R20.0 NUMBNESS AND TINGLING IN LEFT ARM: Primary | ICD-10-CM

## 2022-06-15 DIAGNOSIS — M54.89 OTHER ACUTE BACK PAIN: ICD-10-CM

## 2022-06-15 PROCEDURE — 99214 OFFICE O/P EST MOD 30 MIN: CPT | Performed by: OBSTETRICS & GYNECOLOGY

## 2022-06-15 RX ORDER — PHENOL 1.4 %
2 AEROSOL, SPRAY (ML) MUCOUS MEMBRANE
COMMUNITY

## 2022-06-15 ASSESSMENT — PAIN SCALES - GENERAL: PAINLEVEL: NO PAIN (0)

## 2022-06-15 NOTE — PROGRESS NOTES
ASSESSMENT/PLAN:                                                      WORK COMP INJURY:  1.  Numbness and tingling in the left upper extremity since early May.  She thinks this is a work related injury since she works for [employer] and does a lot of lifting especially of angers and clothing and feels this is related to why she has the tingling in her left arm.  I will start with an EMG to better define if she has any nerve impingement.  If it is positive then I will consider an MRI of the neck.    2.  Back pain:Separately she has a concern about bilateral thoracic back pain which I suspect is related to the subscapularis muscles which anchor her shoulder blades down on either side.  I suspect this is related to the work she does in front of her body with her arms extended.  I suggested that she should get some massage to that area but that this is likely to get better and worse better and worse just because of the type of work she does out in front of her body doing lifting.  I suggested that she be very careful about lifting objects close to her body and not extended out in front of her.    3.  Asked her to call me if she does not get an appointment with the EMG in the next 48 hours.  She told me she is going to fill out some paperwork for work comp because she feels that this left arm tingling is related to something she is doing at work.                                                                      SUBJECTIVE:                                                      She works for [employer] in town here.  She states that sometime in the first week in May 2022 she developed some tingling in her left arm and occasionally it becomes numb and tingly.  It comes and goes.  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  She wonders if she is done something to her nerves in her neck or in her arm because of this intermittent tingling sensation.    Separately she has  noticed pain in her shoulder blade areas bilaterally which also comes and goes.  Sometimes when she takes a deep breath or coughs she can feel the pain in between her shoulder blades.  It is not in the midline of the thorax but actually laterally about 6 cm on either side.  Right underneath the shoulder blades.    That discomfort has been on and off for months.  She has seen a chiropractor to have some massage of this area.        Patient Active Problem List   Diagnosis     Mild major depression (H)     CARDIOVASCULAR SCREENING; LDL GOAL LESS THAN 160     GERD (gastroesophageal reflux disease)     Family history of colon cancer     Past Surgical History:   Procedure Laterality Date     COLONOSCOPY      REPEAT AGE 31 AND EVERY 5 YEARS     COLONOSCOPY N/A 2019    Procedure: COLONOSCOPY;  Surgeon: Ricardo Lopez MD;  Location: [facility]     CYSTOSCOPY N/A 2016    Procedure: CYSTOSCOPY;  Surgeon: Eder Evans MD;  Location: [facility]     LAPAROSCOPIC TUBAL LIGATION Bilateral 2016    Procedure: LAPAROSCOPIC TUBAL LIGATION;  Surgeon: Kamaljit Gil MD;  Location: [facility]       Social History     Tobacco Use     Smoking status: Never Smoker     Smokeless tobacco: Never Used   Substance Use Topics     Alcohol use: Yes     Alcohol/week: 7.0 standard drinks     Types: 7 Glasses of wine per week     Comment: 1 glass wine daily- most days     Family History   Problem Relation Age of Onset     Cerebrovascular Disease Father         BRAIN ANEURYSM in his early 30s     Genitourinary Problems Father         polycystic kidney disease     Other - See Comments Father 51         in a motorcycle accident     Hypertension Maternal Grandmother      Hypertension Maternal Grandfather      Cancer - colorectal Mother 45     Other - See Comments Sister         older     Post-Traumatic Stress Disorder (PTSD) Sister      Other - See Comments Brother 20         in an MVA         Current Outpatient Medications    Medication Sig Dispense Refill     Multiple Vitamins-Minerals (MULTIVITAMIN WOMEN) TABS Take 2 Gum by mouth       naproxen (NAPROSYN) 500 MG tablet  (Patient not taking: No sig reported)         ROS:  A 12 point systems review is negative except for what is listed above in the Subjective history.        Wt Readings from Last 3 Encounters:   06/15/22 62.1 kg (137 lb)   04/05/22 62.6 kg (138 lb)   01/21/22 61.7 kg (136 lb)                     BP Readings from Last 6 Encounters:   06/15/22 112/68   04/05/22 124/78   01/21/22 124/76   09/28/21 122/84   08/31/21 128/74   01/05/21 120/72          OBJECTIVE:                                                    Vital signs: /68 (BP Location: Left arm, Patient Position: Sitting, Cuff Size: Adult Regular)   Pulse 88   Temp 98  F (36.7  C) (Temporal)   Resp 16   Wt 62.1 kg (137 lb)   LMP 06/05/2022   SpO2 100%   BMI 24.42 kg/m           Neck is supple, mobile, no adenoapthy or masses palpable. Normal range of motion noted.     Chest is clear to auscultation. No wheezes, rales or rhonchi heard.  cardiac exam is normal with s1, s2, no murmurs or adventitious sounds.Normal rate and rhythm is heard.     Exam of the back reveals tenderness discretely directly beneath the shoulder blades and on the medial aspect of both shoulder blades.    Exam of the left upper extremity is unremarkable.  She has normal pulses in the wrist.  Normal  strength in the hand.      A total of 30 minutes were spent in today's visit including the time spent with  the patient in addition to the time spent just prior to the visit reviewing the chart  and then charting afterwards on this patient today.      Eder Evans MD  [facility]       The above information was dictating using Dragon voice software and as a result there may be some irregularities that were not detected in my review of this note.

## 2022-08-31 ENCOUNTER — OFFICE VISIT (OUTPATIENT)
Dept: FAMILY MEDICINE | Facility: OTHER | Age: 37
End: 2022-08-31
Payer: COMMERCIAL

## 2022-08-31 VITALS
SYSTOLIC BLOOD PRESSURE: 108 MMHG | BODY MASS INDEX: 24.27 KG/M2 | HEART RATE: 82 BPM | DIASTOLIC BLOOD PRESSURE: 76 MMHG | TEMPERATURE: 98.2 F | HEIGHT: 63 IN | WEIGHT: 137 LBS | OXYGEN SATURATION: 100 % | RESPIRATION RATE: 12 BRPM

## 2022-08-31 DIAGNOSIS — Z00.00 ROUTINE GENERAL MEDICAL EXAMINATION AT A HEALTH CARE FACILITY: Primary | ICD-10-CM

## 2022-08-31 DIAGNOSIS — F43.21 ADJUSTMENT DISORDER WITH DEPRESSED MOOD: ICD-10-CM

## 2022-08-31 DIAGNOSIS — R20.0 RIGHT ARM NUMBNESS: ICD-10-CM

## 2022-08-31 PROCEDURE — 99395 PREV VISIT EST AGE 18-39: CPT | Performed by: FAMILY MEDICINE

## 2022-08-31 PROCEDURE — 99213 OFFICE O/P EST LOW 20 MIN: CPT | Mod: 25 | Performed by: FAMILY MEDICINE

## 2022-08-31 ASSESSMENT — ENCOUNTER SYMPTOMS
FEVER: 0
CONSTIPATION: 0
CHILLS: 0
WEAKNESS: 0
NAUSEA: 0
HEARTBURN: 0
FREQUENCY: 0
NERVOUS/ANXIOUS: 1
ABDOMINAL PAIN: 0
HEMATURIA: 0
DIZZINESS: 1
PALPITATIONS: 1
HEMATOCHEZIA: 0
EYE PAIN: 0
BREAST MASS: 0
COUGH: 0
ARTHRALGIAS: 1
PARESTHESIAS: 0
MYALGIAS: 0
SORE THROAT: 0
SHORTNESS OF BREATH: 0
DIARRHEA: 0
HEADACHES: 0
DYSURIA: 0
JOINT SWELLING: 0

## 2022-08-31 ASSESSMENT — PAIN SCALES - GENERAL: PAINLEVEL: NO PAIN (0)

## 2022-08-31 NOTE — PROGRESS NOTES
SUBJECTIVE:   CC: Mandy Montgomery is an 37 year old woman who presents for preventive health visit.       Patient has been advised of split billing requirements and indicates understanding: Yes  Healthy Habits:     Getting at least 3 servings of Calcium per day:  Yes    Bi-annual eye exam:  Yes    Dental care twice a year:  Yes    Sleep apnea or symptoms of sleep apnea:  Daytime drowsiness    Diet:  Low salt, Low fat/cholesterol and Gluten-free/reduced    Frequency of exercise:  None    Taking medications regularly:  Yes    Medication side effects:  Not applicable    PHQ-2 Total Score: 1    Additional concerns today:  Yes (EMG? )    Questions on EMG and why ordered. Wonders how it will help.    Today's PHQ-2 Score:   PHQ-2 ( 1999 [employer]) 8/31/2022   Q1: Little interest or pleasure in doing things 0   Q2: Feeling down, depressed or hopeless 1   PHQ-2 Score 1   PHQ-2 Total Score (12-17 Years)- Positive if 3 or more points; Administer PHQ-A if positive -   Q1: Little interest or pleasure in doing things Not at all   Q2: Feeling down, depressed or hopeless Several days   PHQ-2 Score 1       Abuse: Current or Past (Physical, Sexual or Emotional) - No  Do you feel safe in your environment? Yes    Have you ever done Advance Care Planning? (For example, a Health Directive, POLST, or a discussion with a medical provider or your loved ones about your wishes): No, advance care planning information given to patient to review.  Advanced care planning was discussed at today's visit.    Social History     Tobacco Use     Smoking status: Never Smoker     Smokeless tobacco: Never Used   Substance Use Topics     Alcohol use: Yes     Alcohol/week: 7.0 standard drinks     Types: 7 Glasses of wine per week     Comment: sometimes         Alcohol Use 8/31/2022   Prescreen: >3 drinks/day or >7 drinks/week? No   Prescreen: >3 drinks/day or >7 drinks/week? -       Reviewed orders with patient.  Reviewed health maintenance and updated  orders accordingly - Yes  Labs reviewed in EPIC    Breast Cancer Screening:    FHS-7:   Breast CA Risk Assessment (FHS-7) 8/31/2022   Did any of your first-degree relatives have breast or ovarian cancer? No   Did any of your relatives have bilateral breast cancer? Unknown   Did any man in your family have breast cancer? No   Did any woman in your family have breast and ovarian cancer? Unknown   Did any woman in your family have breast cancer before age 50 y? Unknown   Do you have 2 or more relatives with breast and/or ovarian cancer? Yes   Do you have 2 or more relatives with breast and/or bowel cancer? Unknown       Patient under 40 years of age: Routine Mammogram Screening not recommended.   Pertinent mammograms are reviewed under the imaging tab.    History of abnormal Pap smear: NO - age 30-65 PAP every 5 years with negative HPV co-testing recommended  PAP / HPV Latest Ref Rng & Units 1/5/2021 3/21/2019 4/19/2016   PAP (Historical) - NIL NIL NIL   HPV16 NEG:Negative Negative Negative -   HPV18 NEG:Negative Negative Negative -   HRHPV NEG:Negative Negative Negative -     Reviewed and updated as needed this visit by clinical staff   Tobacco  Allergies  Meds  Problems  Med Hx  Surg Hx  Fam Hx  Soc   Hx          Reviewed and updated as needed this visit by Provider   Tobacco  Allergies  Meds  Problems  Med Hx  Surg Hx  Fam Hx               Review of Systems  CONSTITUTIONAL: NEGATIVE for fever, chills, change in weight  INTEGUMENTARU/SKIN: NEGATIVE for worrisome rashes, moles or lesions  EYES: NEGATIVE for vision changes or irritation  ENT: NEGATIVE for ear, mouth and throat problems  RESP: NEGATIVE for significant cough or SOB  BREAST: NEGATIVE for masses, tenderness or discharge  CV: NEGATIVE for chest pain, palpitations or peripheral edema  GI: NEGATIVE for nausea, abdominal pain, heartburn, or change in bowel habits  : NEGATIVE for unusual urinary or vaginal symptoms. Periods are  "regular.  MUSCULOSKELETAL: NEGATIVE for significant arthralgias or myalgia  NEURO: NEGATIVE for weakness, dizziness or paresthesias  PSYCHIATRIC: NEGATIVE for changes in mood or affect     OBJECTIVE:   /76   Pulse 82   Temp 98.2  F (36.8  C) (Temporal)   Resp 12   Ht 1.6 m (5' 3\")   Wt 62.1 kg (137 lb)   LMP 06/10/2022   SpO2 100%   BMI 24.27 kg/m    Physical Exam  GENERAL: healthy, alert and no distress  EYES: Eyes grossly normal to inspection, PERRL and conjunctivae and sclerae normal  HENT: ear canals and TM's normal, nose and mouth without ulcers or lesions  NECK: no adenopathy, no asymmetry, masses, or scars and thyroid normal to palpation  RESP: lungs clear to auscultation - no rales, rhonchi or wheezes  BREAST: normal without masses, tenderness or nipple discharge and no palpable axillary masses or adenopathy  CV: regular rate and rhythm, normal S1 S2, no S3 or S4, no murmur, click or rub, no peripheral edema and peripheral pulses strong  ABDOMEN: soft, nontender, no hepatosplenomegaly, no masses and bowel sounds normal  MS: no gross musculoskeletal defects noted, no edema  SKIN: no suspicious lesions or rashes  NEURO: Normal strength and tone, mentation intact and speech normal  PSYCH: mentation appears normal, affect normal/bright        ASSESSMENT/PLAN:       ICD-10-CM    1. Routine general medical examination at a health care facility  Z00.00    2. Right arm numbness  R20.0 OFFICE/OUTPT VISIT,EST,LEVL III   3. Adjustment disorder with depressed mood  F43.21       Discussed her arm numbness with position is likely needing evaluation of the area of likely pinching. Though she has pain with overhead movments (shoulder) and with elbow bending. This makes it more likely it is coming from neck. She was advised EMG will help sort this out as she could have 2 different problems instead. She is agreeable to this testing.    Also she has been feeling depressed recently, but is wanting to start by " "focusing on self cares - DAP given.    In addition to preventative visit, 18 min spent on the date of the encounter in chart review, patient visit, review of tests, documentation and/or discussion with other providers about the issues documented above.       Patient has been advised of split billing requirements and indicates understanding: Yes    COUNSELING:  Reviewed preventive health counseling, as reflected in patient instructions       Regular exercise       Healthy diet/nutrition    Estimated body mass index is 24.27 kg/m  as calculated from the following:    Height as of this encounter: 1.6 m (5' 3\").    Weight as of this encounter: 62.1 kg (137 lb).        She reports that she has never smoked. She has never used smokeless tobacco.      Counseling Resources:  ATP IV Guidelines  Pooled Cohorts Equation Calculator  Breast Cancer Risk Calculator  BRCA-Related Cancer Risk Assessment: FHS-7 Tool  FRAX Risk Assessment  ICSI Preventive Guidelines  Dietary Guidelines for Americans, 2010  USDA's MyPlate  ASA Prophylaxis  Lung CA Screening    Alexia Churchill MD, MD  [facility]  "

## 2022-08-31 NOTE — LETTER
My Depression Action Plan  Name: Mandy Montgomery   Date of Birth 1985  Date: 8/31/2022    My doctor: Kamaljit Gil   My clinic: [address]  [facility] [id number] [phone]          GREEN    ZONE   Good Control    What it looks like:     Things are going generally well. You have normal ups and downs. You may even feel depressed from time to time, but bad moods usually last less than a day.   What you need to do:  1. Continue to care for yourself (see self care plan)  2. Check your depression survival kit and update it as needed  3. Follow your physician s recommendations including any medication.  4. Do not stop taking medication unless you consult with your physician first.           YELLOW         ZONE Getting Worse    What it looks like:     Depression is starting to interfere with your life.     It may be hard to get out of bed; you may be starting to isolate yourself from others.    Symptoms of depression are starting to last most all day and this has happened for several days.     You may have suicidal thoughts but they are not constant.   What you need to do:     1. Call your care team. Your response to treatment will improve if you keep your care team informed of your progress. Yellow periods are signs an adjustment may need to be made.     2. Continue your self-care.  Just get dressed and ready for the day.  Don't give yourself time to talk yourself out of it.    3. Talk to someone in your support network.    4. Open up your Depression Self-Care Plan/Wellness Kit.           RED    ZONE Medical Alert - Get Help    What it looks like:     Depression is seriously interfering with your life.     You may experience these or other symptoms: You can t get out of bed most days, can t work or engage in other necessary activities, you have trouble taking care of basic hygiene, or basic responsibilities, thoughts of suicide or death that  will not go away, self-injurious behavior.     What you need to do:  1. Call your care team and request a same-day appointment. If they are not available (weekends or after hours) call your local crisis line, emergency room or 911.          Depression Self-Care Plan / Wellness Kit    Many people find that medication and therapy are helpful treatments for managing depression. In addition, making small changes to your everyday life can help to boost your mood and improve your wellbeing. Below are some tips for you to consider. Be sure to talk with your medical provider and/or behavioral health consultant if your symptoms are worsening or not improving.     Sleep   Sleep hygiene  means all of the habits that support good, restful sleep. It includes maintaining a consistent bedtime and wake time, using your bedroom only for sleeping or sex, and keeping the bedroom dark and free of distractions like a computer, smartphone, or television.     Develop a Healthy Routine  Maintain good hygiene. Get out of bed in the morning, make your bed, brush your teeth, take a shower, and get dressed. Don t spend too much time viewing media that makes you feel stressed. Find time to relax each day.    Exercise  Get some form of exercise every day. This will help reduce pain and release endorphins, the  feel good  chemicals in your brain. It can be as simple as just going for a walk or doing some gardening, anything that will get you moving.      Diet  Strive to eat healthy foods, including fruits and vegetables. Drink plenty of water. Avoid excessive sugar, caffeine, alcohol, and other mood-altering substances.     Stay Connected with Others  Stay in touch with friends and family members.    Manage Your Mood  Try deep breathing, massage therapy, biofeedback, or meditation. Take part in fun activities when you can. Try to find something to smile about each day.     Psychotherapy  Be open to working with a therapist if your provider  recommends it.     Medication  Be sure to take your medication as prescribed. Most anti-depressants need to be taken every day. It usually takes several weeks for medications to work. Not all medicines work for all people. It is important to follow-up with your provider to make sure you have a treatment plan that is working for you. Do not stop your medication abruptly without first discussing it with your provider.    Crisis Resources   These hotlines are for both adults and children. They and are open 24 hours a day, 7 days a week unless noted otherwise.      National Suicide Prevention Lifeline   988 or 6-253-174-XVTL (1948)      Crisis Text Line    www.crisistextline.org  Text HOME to 217987 from anywhere in the [place], anytime, about any type of crisis. A live, trained crisis counselor will receive the text and respond quickly.      [place] Lifeline for LGBTQ Youth  A national crisis intervention and suicide lifeline for LGBTQ youth under 25. Provides a safe place to talk without judgement. Call 1-642.356.2220; text START to 450337 or visit www.thetrevorproject.org to talk to a trained counselor.      For [facility] crisis numbers, visit the [facility] website at:  https://[place].gov/dhs/people-we-serve/adults/health-care/mental-health/resources/crisis-contacts.jsp

## 2023-04-20 ENCOUNTER — OFFICE VISIT (OUTPATIENT)
Dept: FAMILY MEDICINE | Facility: OTHER | Age: 38
End: 2023-04-20
Payer: COMMERCIAL

## 2023-04-20 VITALS
WEIGHT: 142 LBS | HEIGHT: 63 IN | DIASTOLIC BLOOD PRESSURE: 78 MMHG | SYSTOLIC BLOOD PRESSURE: 118 MMHG | RESPIRATION RATE: 20 BRPM | HEART RATE: 84 BPM | OXYGEN SATURATION: 99 % | TEMPERATURE: 99.3 F | BODY MASS INDEX: 25.16 KG/M2

## 2023-04-20 DIAGNOSIS — F41.1 GAD (GENERALIZED ANXIETY DISORDER): ICD-10-CM

## 2023-04-20 DIAGNOSIS — F43.21 ADJUSTMENT DISORDER WITH DEPRESSED MOOD: ICD-10-CM

## 2023-04-20 DIAGNOSIS — M54.2 CERVICALGIA: ICD-10-CM

## 2023-04-20 DIAGNOSIS — F43.0 ACUTE REACTION TO STRESS: ICD-10-CM

## 2023-04-20 DIAGNOSIS — G44.209 TENSION HEADACHE: Primary | ICD-10-CM

## 2023-04-20 PROCEDURE — 99214 OFFICE O/P EST MOD 30 MIN: CPT | Performed by: PHYSICIAN ASSISTANT

## 2023-04-20 ASSESSMENT — PATIENT HEALTH QUESTIONNAIRE - PHQ9
SUM OF ALL RESPONSES TO PHQ QUESTIONS 1-9: 5
10. IF YOU CHECKED OFF ANY PROBLEMS, HOW DIFFICULT HAVE THESE PROBLEMS MADE IT FOR YOU TO DO YOUR WORK, TAKE CARE OF THINGS AT HOME, OR GET ALONG WITH OTHER PEOPLE: NOT DIFFICULT AT ALL
SUM OF ALL RESPONSES TO PHQ QUESTIONS 1-9: 5

## 2023-04-20 ASSESSMENT — PAIN SCALES - GENERAL: PAINLEVEL: MODERATE PAIN (4)

## 2023-04-20 NOTE — PATIENT INSTRUCTIONS
I think a lot of your symptoms are stress related.    Will refer you for counseling.    Consider more frequent use of ibuprofen, 400 mg 2-3 times daily.  Consider chiropractic again.    Follow-up if not improving.

## 2023-04-20 NOTE — PROGRESS NOTES
Assessment & Plan       ICD-10-CM    1. Tension headache  G44.209       2. Cervicalgia  M54.2       3. Acute reaction to stress  F43.0 Adult Mental Health  Referral      4. Adjustment disorder with depressed mood  F43.21 Adult Mental Health  Referral      5. MARY (generalized anxiety disorder)  F41.1 Adult Mental Health  Referral          1-5. Her current issues all seem to be stress related, especially given the recent loss of her coworker. There is no cervical adenopathy on exam or any evidence of acute illness. She has tension type headaches with increased musculoskeletal pain in her neck and low back along with worsening anxiety. She has a history of anxiety and depression but does not want to take medications. She is open to counseling so will place a referral. It sounds like she has a good support system of people with whom she can talk. I recommend more frequent use of ibuprofen 400 mg 2-3 times daily for the next week since this has been beneficial. I also recommend she consider a chiropractic adjustment which has helped in the past. She is in agreement with this plan.     Gilmer Zhu PA-C  [facility] LALO lee is a 37 year old, presenting for the following health issues:  Lymphadenopathy        4/20/2023     3:23 PM   Additional Questions   Roomed by Sammi HILL   Accompanied by EARL         4/20/2023     3:23 PM   Patient Reported Additional Medications   Patient reports taking the following new medications NA     History of Present Illness       Back Pain:  She presents for follow up of back pain. Patient's back pain is a recurring problem.  Location of back pain:  Right lower back, left lower back, right middle of back, left middle of back, right side of neck and left side of neck  Description of back pain: burning, dull ache, gnawing and sharp  Back pain spreads: nowhere    Since patient first noticed back pain, pain is: always present,  "but gets better and worse  Does back pain interfere with her job:  Yes      Headaches:   Since the patient's last clinic visit, headaches are: no change  The patient is getting headaches:  Once in a while not every day  She is able to do normal daily activities when she has a migraine.  The patient is taking the following rescue/relief medications:  No rescue/relief medications   Patient states \"I get some relief\" from the rescue/relief medications.   The patient is taking the following medications to prevent migraines:  No medications to prevent migraines  In the past 4 weeks, the patient has gone to an Urgent Care or Emergency Room 0 times times due to headaches.    Reason for visit:  Lymph node concern headaches n ear ache sharp head pains vision blury spots  Symptom onset:  1-3 days ago  Symptoms include:  [place] lymph node on right side sharp occasional pains by ears n eyes ear ache spotty vision pressure inner ear  Symptom intensity:  Mild  Symptom progression:  Staying the same  Had these symptoms before:  Yes  Has tried/received treatment for these symptoms:  No  What makes it worse:  Back pain  What makes it better:  No    She eats 2-3 servings of fruits and vegetables daily.She consumes 0 sweetened beverage(s) daily.She exercises with enough effort to increase her heart rate 10 to 19 minutes per day.  She exercises with enough effort to increase her heart rate 3 or less days per week.   She is taking medications regularly.    Today's PHQ-9         PHQ-9 Total Score: 5    PHQ-9 Q9 Thoughts of better off dead/self-harm past 2 weeks :   Not at all    How difficult have these problems made it for you to do your work, take care of things at home, or get along with other people: Not difficult at all       She admits to being more stressed this past week as her co-worker/friend passed away so this has been causing more grief, anxiety and depression. She has noticed increased headaches since the weekend and " "thought it may be her period but now thinks it could be stress related. There are some sharp pain in the front of her head at times along with occasional spots in her vision. She denies visual loss, facial droop, or extremity weakness. She also has some pain and tightness in the muscles of her neck and low back. She has tried ibuprofen with some relief but states she does not like to take medications. The most concerning symptoms for her are what she feels are swollen lymph nodes over the right side of her neck. She denies a lot of pain in this area but it feels tight. No cough, sore throat, or new nasal congestion. No fevers or chills.        Review of Systems   Constitutional, HEENT, cardiovascular, pulmonary, musculoskeletal, neuro, gi and gu systems are negative, except as otherwise noted.      Objective    /78   Pulse 84   Temp 99.3  F (37.4  C) (Temporal)   Resp 20   Ht 1.59 m (5' 2.6\")   Wt 64.4 kg (142 lb)   LMP 04/14/2023 (Exact Date)   SpO2 99%   BMI 25.48 kg/m    Body mass index is 25.48 kg/m .  Physical Exam   GENERAL: healthy, alert and no distress  EYES: Eyes grossly normal to inspection, PERRL and conjunctivae and sclerae normal  HENT: ear canals and TM's normal, nose and mouth without ulcers or lesions  NECK: no adenopathy, no asymmetry, masses, or scars and thyroid normal to palpation  RESP: lungs clear to auscultation - no rales, rhonchi or wheezes  CV: regular rate and rhythm, normal S1 S2, no S3 or S4, no murmur, click or rub, no peripheral edema and peripheral pulses strong  ABDOMEN: soft, nontender, no hepatosplenomegaly, no masses and bowel sounds normal  MS: no gross musculoskeletal defects noted, no edema. Increased tension in the bilateral trapezius muscles.   NEURO: Normal strength and tone, mentation intact and speech normal. Cranial nerves II-XII are grossly intact. DTRs are 2+/4 throughout and symmetric. Gait is stable.   PSYCH: mentation appears normal, affect " normal/bright

## 2023-04-23 ENCOUNTER — HEALTH MAINTENANCE LETTER (OUTPATIENT)
Age: 38
End: 2023-04-23

## 2023-05-22 ENCOUNTER — ANCILLARY PROCEDURE (OUTPATIENT)
Dept: CARDIOLOGY | Facility: CLINIC | Age: 38
End: 2023-05-22
Attending: PHYSICIAN ASSISTANT
Payer: COMMERCIAL

## 2023-05-22 ENCOUNTER — OFFICE VISIT (OUTPATIENT)
Dept: FAMILY MEDICINE | Facility: CLINIC | Age: 38
End: 2023-05-22
Payer: COMMERCIAL

## 2023-05-22 VITALS
OXYGEN SATURATION: 99 % | RESPIRATION RATE: 16 BRPM | HEART RATE: 82 BPM | SYSTOLIC BLOOD PRESSURE: 124 MMHG | HEIGHT: 63 IN | DIASTOLIC BLOOD PRESSURE: 80 MMHG | WEIGHT: 142 LBS | TEMPERATURE: 97.8 F | BODY MASS INDEX: 25.16 KG/M2

## 2023-05-22 DIAGNOSIS — R00.2 HEART PALPITATIONS: Primary | ICD-10-CM

## 2023-05-22 DIAGNOSIS — R00.2 HEART PALPITATIONS: ICD-10-CM

## 2023-05-22 LAB
BASOPHILS # BLD AUTO: 0.1 10E3/UL (ref 0–0.2)
BASOPHILS NFR BLD AUTO: 1 %
EOSINOPHIL # BLD AUTO: 0.1 10E3/UL (ref 0–0.7)
EOSINOPHIL NFR BLD AUTO: 1 %
ERYTHROCYTE [DISTWIDTH] IN BLOOD BY AUTOMATED COUNT: 13.3 % (ref 10–15)
HCT VFR BLD AUTO: 42.7 % (ref 35–47)
HGB BLD-MCNC: 14.8 G/DL (ref 11.7–15.7)
LYMPHOCYTES # BLD AUTO: 1.7 10E3/UL (ref 0.8–5.3)
LYMPHOCYTES NFR BLD AUTO: 31 %
MCH RBC QN AUTO: 30.1 PG (ref 26.5–33)
MCHC RBC AUTO-ENTMCNC: 34.7 G/DL (ref 31.5–36.5)
MCV RBC AUTO: 87 FL (ref 78–100)
MONOCYTES # BLD AUTO: 0.3 10E3/UL (ref 0–1.3)
MONOCYTES NFR BLD AUTO: 6 %
NEUTROPHILS # BLD AUTO: 3.4 10E3/UL (ref 1.6–8.3)
NEUTROPHILS NFR BLD AUTO: 62 %
PLATELET # BLD AUTO: 225 10E3/UL (ref 150–450)
RBC # BLD AUTO: 4.92 10E6/UL (ref 3.8–5.2)
WBC # BLD AUTO: 5.5 10E3/UL (ref 4–11)

## 2023-05-22 PROCEDURE — 84443 ASSAY THYROID STIM HORMONE: CPT | Performed by: PHYSICIAN ASSISTANT

## 2023-05-22 PROCEDURE — 36415 COLL VENOUS BLD VENIPUNCTURE: CPT | Performed by: PHYSICIAN ASSISTANT

## 2023-05-22 PROCEDURE — 80048 BASIC METABOLIC PNL TOTAL CA: CPT | Performed by: PHYSICIAN ASSISTANT

## 2023-05-22 PROCEDURE — 99213 OFFICE O/P EST LOW 20 MIN: CPT | Performed by: PHYSICIAN ASSISTANT

## 2023-05-22 PROCEDURE — 93000 ELECTROCARDIOGRAM COMPLETE: CPT | Performed by: PHYSICIAN ASSISTANT

## 2023-05-22 PROCEDURE — 85025 COMPLETE CBC W/AUTO DIFF WBC: CPT | Performed by: PHYSICIAN ASSISTANT

## 2023-05-22 ASSESSMENT — PAIN SCALES - GENERAL: PAINLEVEL: NO PAIN (0)

## 2023-05-22 NOTE — PATIENT INSTRUCTIONS
Your lab results will be available within 24-48 hours on [employer].     EKG is normal.     Heart monitor test will be worn x 7 days.

## 2023-05-22 NOTE — PROGRESS NOTES
"  Assessment & Plan     Heart palpitations  Check the following tests.   EKG normal.   Zio monitor placed today.   Stop any caffeine use.   Stop taking supplements.   Results of tests will be communicated via [employer].   Also discussed anxiety as a factor, she is not interested in treatment at this time.   - EKG 12-lead complete w/read - Clinics  - TSH with free T4 reflex; Future  - Basic metabolic panel  (Ca, Cl, CO2, Creat, Gluc, K, Na, BUN); Future  - CBC with platelets and differential; Future  - Adult Leadless EKG Monitor 3 to 7 Days; Future  - TSH with free T4 reflex  - Basic metabolic panel  (Ca, Cl, CO2, Creat, Gluc, K, Na, BUN)  - CBC with platelets and differential             BMI:   Estimated body mass index is 25.48 kg/m  as calculated from the following:    Height as of this encounter: 1.59 m (5' 2.6\").    Weight as of this encounter: 64.4 kg (142 lb).   Weight management plan: Discussed healthy diet and exercise guidelines        Kristen M. Kehr, PA-C [facility]   mandy is a 37 year old, presenting for the following health issues:  Palpitations  Mandy has been having heart palpitations for the past month.   They occur daily.   No particular triggers with each episode. She does not feel faint when they occur. They last for a few minutes and resolve on their own.   She has tried to eliminate caffeine. She does use over the counter vitamin supplements. She is not sure if the palpitations started around the same time as when she started taking he supplements.   No stimulant medications / allergy or decongestants.   She does have mild anxiety.   Family history of heart disease in her father. He was in his 30s when he had a ruptured aneurysm.           5/22/2023     1:08 PM   Additional Questions   Roomed by Genevieve BROWNING   Accompanied by self     Palpitations    History of Present Illness       Vascular Disease:  She presents for follow up of vascular disease.  She never " "takes nitroglycerin. She is not taking daily aspirin.    She eats 2-3 servings of fruits and vegetables daily.She consumes 1 sweetened beverage(s) daily.She exercises with enough effort to increase her heart rate 10 to 19 minutes per day.  She exercises with enough effort to increase her heart rate 3 or less days per week.   She is taking medications regularly.               Review of Systems   Constitutional, HEENT, cardiovascular, pulmonary, GI, , musculoskeletal, neuro, skin, endocrine and psych systems are negative, except as otherwise noted.      Objective    /80   Pulse 82   Temp 97.8  F (36.6  C) (Tympanic)   Resp 16   Ht 1.59 m (5' 2.6\")   Wt 64.4 kg (142 lb)   LMP 05/14/2023 (Exact Date)   SpO2 99%   BMI 25.48 kg/m    Body mass index is 25.48 kg/m .  Physical Exam   GENERAL: healthy, alert and no distress  NECK: no adenopathy, no asymmetry, masses, or scars and thyroid normal to palpation  RESP: lungs clear to auscultation - no rales, rhonchi or wheezes  CV: regular rate and rhythm, normal S1 S2, no S3 or S4, no murmur, click or rub, no peripheral edema and peripheral pulses strong  MS: no gross musculoskeletal defects noted, no edema  SKIN: no suspicious lesions or rashes  NEURO: Normal strength and tone, mentation intact and speech normal  PSYCH: mentation appears normal, affect normal/bright    EKG - Reviewed and interpreted by me appears normal, NSR, normal axis, normal intervals, no acute ST/T changes c/w ischemia, no LVH by voltage criteria, unchanged from previous tracings                "

## 2023-05-22 NOTE — PROGRESS NOTES
I have place a 7 days Zio Patch on this patient. Patient was given Zio Patch instructions and [employer] contact information. Patient understands when they should remove and return their monitor to [employer].   NALLELY Hoffmann

## 2023-05-23 LAB
ANION GAP SERPL CALCULATED.3IONS-SCNC: 6 MMOL/L (ref 3–14)
BUN SERPL-MCNC: 12 MG/DL (ref 7–30)
CALCIUM SERPL-MCNC: 8.9 MG/DL (ref 8.5–10.1)
CHLORIDE BLD-SCNC: 107 MMOL/L (ref 94–109)
CO2 SERPL-SCNC: 27 MMOL/L (ref 20–32)
CREAT SERPL-MCNC: 0.73 MG/DL (ref 0.52–1.04)
GFR SERPL CREATININE-BSD FRML MDRD: >90 ML/MIN/1.73M2
GLUCOSE BLD-MCNC: 104 MG/DL (ref 70–99)
POTASSIUM BLD-SCNC: 3.7 MMOL/L (ref 3.4–5.3)
SODIUM SERPL-SCNC: 140 MMOL/L (ref 133–144)
TSH SERPL DL<=0.005 MIU/L-ACNC: 0.8 MU/L (ref 0.4–4)

## 2023-11-16 ENCOUNTER — OFFICE VISIT (OUTPATIENT)
Dept: FAMILY MEDICINE | Facility: CLINIC | Age: 38
End: 2023-11-16
Payer: COMMERCIAL

## 2023-11-16 VITALS
RESPIRATION RATE: 18 BRPM | WEIGHT: 145 LBS | HEART RATE: 85 BPM | TEMPERATURE: 98.2 F | BODY MASS INDEX: 26.68 KG/M2 | SYSTOLIC BLOOD PRESSURE: 127 MMHG | HEIGHT: 62 IN | DIASTOLIC BLOOD PRESSURE: 80 MMHG | OXYGEN SATURATION: 100 %

## 2023-11-16 DIAGNOSIS — Z00.00 ROUTINE GENERAL MEDICAL EXAMINATION AT A HEALTH CARE FACILITY: Primary | ICD-10-CM

## 2023-11-16 PROCEDURE — 99395 PREV VISIT EST AGE 18-39: CPT | Performed by: PHYSICIAN ASSISTANT

## 2023-11-16 ASSESSMENT — ENCOUNTER SYMPTOMS
PALPITATIONS: 0
NERVOUS/ANXIOUS: 0
DIZZINESS: 0
WEAKNESS: 0
CHILLS: 0
CONSTIPATION: 0
FEVER: 0
NAUSEA: 0
ARTHRALGIAS: 0
JOINT SWELLING: 0
DIARRHEA: 0
HEADACHES: 0
SHORTNESS OF BREATH: 0
COUGH: 0
HEARTBURN: 0
ABDOMINAL PAIN: 0
HEMATOCHEZIA: 0
SORE THROAT: 0
HEMATURIA: 0
FREQUENCY: 0
DYSURIA: 0
EYE PAIN: 0
MYALGIAS: 0
PARESTHESIAS: 0
BREAST MASS: 0

## 2023-11-16 ASSESSMENT — PAIN SCALES - GENERAL: PAINLEVEL: NO PAIN (0)

## 2023-11-16 ASSESSMENT — PATIENT HEALTH QUESTIONNAIRE - PHQ9
10. IF YOU CHECKED OFF ANY PROBLEMS, HOW DIFFICULT HAVE THESE PROBLEMS MADE IT FOR YOU TO DO YOUR WORK, TAKE CARE OF THINGS AT HOME, OR GET ALONG WITH OTHER PEOPLE: NOT DIFFICULT AT ALL
SUM OF ALL RESPONSES TO PHQ QUESTIONS 1-9: 2
SUM OF ALL RESPONSES TO PHQ QUESTIONS 1-9: 2

## 2023-11-16 NOTE — PROGRESS NOTES
SUBJECTIVE:   rosa is a 38 year old, presenting for the following:  Physical        11/16/2023     8:58 AM   Additional Questions   Roomed by Jani RODGERS MA       Healthy Habits:     Getting at least 3 servings of Calcium per day:  Yes    Bi-annual eye exam:  Yes    Dental care twice a year:  Yes    Sleep apnea or symptoms of sleep apnea:  None    Diet:  Low salt, Low fat/cholesterol, Vegetarian/vegan and Gluten-free/reduced    Frequency of exercise:  1 day/week    Duration of exercise:  Less than 15 minutes    Taking medications regularly:  Yes    Medication side effects:  None    Additional concerns today:  No      Today's PHQ-9 Score:       11/16/2023     8:49 AM   PHQ-9 SCORE   PHQ-9 Total Score MyChart 2 (Minimal depression)   PHQ-9 Total Score 2               Social History     Tobacco Use    Smoking status: Never    Smokeless tobacco: Never   Substance Use Topics    Alcohol use: Yes     Alcohol/week: 7.0 standard drinks of alcohol     Types: 7 Glasses of wine per week     Comment: sometimes             11/16/2023     8:56 AM   Alcohol Use   Prescreen: >3 drinks/day or >7 drinks/week? No          No data to display              Reviewed orders with patient.  Reviewed health maintenance and updated orders accordingly - Yes  BP Readings from Last 3 Encounters:   11/16/23 127/80   05/22/23 124/80   04/20/23 118/78    Wt Readings from Last 3 Encounters:   11/16/23 65.8 kg (145 lb)   05/22/23 64.4 kg (142 lb)   04/20/23 64.4 kg (142 lb)                  Patient Active Problem List   Diagnosis    Mild major depression (H)    CARDIOVASCULAR SCREENING; LDL GOAL LESS THAN 160    GERD (gastroesophageal reflux disease)    Family history of colon cancer     Past Surgical History:   Procedure Laterality Date    COLONOSCOPY  1/09    REPEAT AGE 31 AND EVERY 5 YEARS    COLONOSCOPY N/A 5/16/2019    Procedure: COLONOSCOPY;  Surgeon: Ricardo Lopez MD;  Location:  GI    CYSTOSCOPY N/A 12/29/2016    Procedure: CYSTOSCOPY;   Surgeon: Eder Evans MD;  Location: [facility]    LAPAROSCOPIC TUBAL LIGATION Bilateral 2016    Procedure: LAPAROSCOPIC TUBAL LIGATION;  Surgeon: Kamaljit Gil MD;  Location: [facility] OR       Social History     Tobacco Use    Smoking status: Never    Smokeless tobacco: Never   Substance Use Topics    Alcohol use: Yes     Alcohol/week: 7.0 standard drinks of alcohol     Types: 7 Glasses of wine per week     Comment: sometimes     Family History   Problem Relation Age of Onset    Cerebrovascular Disease Father         BRAIN ANEURYSM in his early 30s    Genitourinary Problems Father         polycystic kidney disease    Other - See Comments Father 51         in a motorcycle accident    Hypertension Maternal Grandmother     Hypertension Maternal Grandfather     Cancer - colorectal Mother 45    Other - See Comments Sister         older    Post-Traumatic Stress Disorder (PTSD) Sister     Other - See Comments Brother 20         in an MVA         Current Outpatient Medications   Medication Sig Dispense Refill    Multiple Vitamins-Minerals (MULTIVITAMIN WOMEN) TABS Take 2 Gum by mouth       Allergies   Allergen Reactions    Azithromycin Rash     PT NOT SURE     Recent Labs   Lab Test 23  1358 22  0950 22  1027 19  1606   A1C  --  5.1  --   --    LDL  --  76  --   --    HDL  --  43*  --   --    TRIG  --  95  --   --    ALT  --   --   --  19   CR 0.73 0.76   < > 0.76   GFRESTIMATED >90 >90   < > >90   GFRESTBLACK  --   --   --  >90   POTASSIUM 3.7 3.8   < > 4.0   TSH 0.80  --   --   --     < > = values in this interval not displayed.        Breast Cancer Screening:    FHS-7:       2022     8:56 AM 2023     8:57 AM   Breast CA Risk Assessment (FHS-7)   Did any of your first-degree relatives have breast or ovarian cancer? No No   Did any of your relatives have bilateral breast cancer? Unknown No   Did any man in your family have breast cancer? No No   Did any woman in your  family have breast and ovarian cancer? Unknown Yes   Did any woman in your family have breast cancer before age 50 y? Unknown No   Do you have 2 or more relatives with breast and/or ovarian cancer? Yes Yes   Do you have 2 or more relatives with breast and/or bowel cancer? Unknown Yes       Patient under 40 years of age: Routine Mammogram Screening not recommended.   Pertinent mammograms are reviewed under the imaging tab.    History of abnormal Pap smear: NO - age 30-65 PAP every 5 years with negative HPV co-testing recommended  Last 3 Pap and HPV Results:       Latest Ref Rng & Units 2021     9:50 AM 2021     9:30 AM 3/21/2019     4:17 PM   PAP / HPV   PAP (Historical)  NIL   NIL    HPV 16 DNA NEG^Negative  Negative     HPV 18 DNA NEG^Negative  Negative     Other HR HPV NEG^Negative  Negative           Latest Ref Rng & Units 2021     9:50 AM 2021     9:30 AM 3/21/2019     4:17 PM   PAP / HPV   PAP (Historical)  NIL   NIL    HPV 16 DNA NEG^Negative  Negative     HPV 18 DNA NEG^Negative  Negative     Other HR HPV NEG^Negative  Negative       Reviewed and updated as needed this visit by clinical staff   Tobacco  Allergies  Meds              Reviewed and updated as needed this visit by Provider                 Past Medical History:   Diagnosis Date    History of anxiety     Irregular menstrual cycle     Other seborrheic dermatitis     Pain in joint, site unspecified       Past Surgical History:   Procedure Laterality Date    COLONOSCOPY      REPEAT AGE 31 AND EVERY 5 YEARS    COLONOSCOPY N/A 2019    Procedure: COLONOSCOPY;  Surgeon: Ricardo Lopez MD;  Location:  GI    CYSTOSCOPY N/A 2016    Procedure: CYSTOSCOPY;  Surgeon: Eder Evans MD;  [facility]    LAPAROSCOPIC TUBAL LIGATION Bilateral 2016    Procedure: LAPAROSCOPIC TUBAL LIGATION;  Surgeon: Kamaljit Gil MD;  Location: [facility]     OB History    Para Term  AB Living   3 3 3 0 0 4   SAB  "IAB Ectopic Multiple Live Births   0 0 0 1 4      # Outcome Date GA Lbr Honorio/2nd Weight Sex Delivery Anes PTL Lv   3 Term 11/22/16 41w1d 04:18 / 01:15 3.6 kg (7 lb 15 oz) M Vag-Spont EPI N JENNIFER      Name: MALAIKA BRANDON      Apgar1: 10  Apgar5: 10   2A Term 06/25/08 40w0d  3.062 kg (6 lb 12 oz) F IVD  N JENNIFER      Name: Patricia   2B Term 06/25/08 40w0d  2.126 kg (4 lb 11 oz) F IVD  N JENNIFER      Name: Miguel   1 Term 11/23/04 40w0d  3.685 kg (8 lb 2 oz) M IVD  N JENNIFER      Name: Abdirizak      Obstetric Comments   EDC 11/15/2016 based on LMP.  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  Mandy's [facility] delivery at [facility].       Review of Systems   Constitutional:  Negative for chills and fever.   HENT:  Negative for congestion, ear pain, hearing loss and sore throat.    Eyes:  Negative for pain and visual disturbance.   Respiratory:  Negative for cough and shortness of breath.    Cardiovascular:  Negative for chest pain, palpitations and peripheral edema.   Gastrointestinal:  Negative for abdominal pain, constipation, diarrhea, heartburn, hematochezia and nausea.   Breasts:  Positive for tenderness. Negative for breast mass and discharge.   Genitourinary:  Positive for vaginal discharge. Negative for dysuria, frequency, genital sores, hematuria, pelvic pain, urgency and vaginal bleeding.   Musculoskeletal:  Negative for arthralgias, joint swelling and myalgias.   Skin:  Negative for rash.   Neurological:  Negative for dizziness, weakness, headaches and paresthesias.   Psychiatric/Behavioral:  Negative for mood changes. The patient is not nervous/anxious.           OBJECTIVE:   /80   Pulse 85   Temp 98.2  F (36.8  C) (Tympanic)   Resp 18   Ht 1.575 m (5' 2\")   Wt 65.8 kg (145 lb)   LMP 10/16/2023   SpO2 100%   Breastfeeding No   BMI 26.52 kg/m    Physical Exam  GENERAL: healthy, alert and no distress  EYES: Eyes grossly normal to inspection, PERRL and conjunctivae and sclerae normal  HENT: ear canals and TM's normal, " "nose and mouth without ulcers or lesions  NECK: no adenopathy, no asymmetry, masses, or scars and thyroid normal to palpation  RESP: lungs clear to auscultation - no rales, rhonchi or wheezes  BREAST: normal without masses, tenderness or nipple discharge and no palpable axillary masses or adenopathy  CV: regular rate and rhythm, normal S1 S2, no S3 or S4, no murmur, click or rub, no peripheral edema and peripheral pulses strong  ABDOMEN: soft, nontender, no hepatosplenomegaly, no masses and bowel sounds normal  MS: no gross musculoskeletal defects noted, no edema  SKIN: no suspicious lesions or rashes  NEURO: Normal strength and tone, mentation intact and speech normal  PSYCH: mentation appears normal, affect normal/bright    Diagnostic Test Results:  Labs reviewed in Epic    ASSESSMENT/PLAN:   (Z00.00) Routine general medical examination at a health care facility  (primary encounter diagnosis)  Comment: Health maintenance reviewed and updated.  Defers immunizations          COUNSELING:  Reviewed preventive health counseling, as reflected in patient instructions       Regular exercise       Healthy diet/nutrition      BMI:   Estimated body mass index is 26.52 kg/m  as calculated from the following:    Height as of this encounter: 1.575 m (5' 2\").    Weight as of this encounter: 65.8 kg (145 lb).   Weight management plan: Discussed healthy diet and exercise guidelines      She reports that she has never smoked. She has never used smokeless tobacco.          Kristen M. Kehr, PA-C [facility]  "

## 2024-02-26 NOTE — RESULT ENCOUNTER NOTE
Lab work from last week was fine your CT scan was generally normal but they did see what looks like a cyst in the right kidney which may or may not be of any significance but they suggested a follow-up ultrasound of this area and we can schedule this.
229.9

## 2024-07-23 ENCOUNTER — OFFICE VISIT (OUTPATIENT)
Dept: FAMILY MEDICINE | Facility: OTHER | Age: 39
End: 2024-07-23
Payer: COMMERCIAL

## 2024-07-23 VITALS
OXYGEN SATURATION: 97 % | HEART RATE: 97 BPM | WEIGHT: 149 LBS | SYSTOLIC BLOOD PRESSURE: 120 MMHG | RESPIRATION RATE: 20 BRPM | DIASTOLIC BLOOD PRESSURE: 80 MMHG | BODY MASS INDEX: 26.4 KG/M2 | HEIGHT: 63 IN | TEMPERATURE: 98.6 F

## 2024-07-23 DIAGNOSIS — R09.82 POST-NASAL DRIP: ICD-10-CM

## 2024-07-23 DIAGNOSIS — H69.93 DYSFUNCTION OF BOTH EUSTACHIAN TUBES: Primary | ICD-10-CM

## 2024-07-23 DIAGNOSIS — J34.89 RHINORRHEA: ICD-10-CM

## 2024-07-23 PROCEDURE — 99213 OFFICE O/P EST LOW 20 MIN: CPT

## 2024-07-23 RX ORDER — FLUTICASONE PROPIONATE 50 MCG
1 SPRAY, SUSPENSION (ML) NASAL DAILY
Qty: 9.9 ML | Refills: 0 | Status: SHIPPED | OUTPATIENT
Start: 2024-07-23

## 2024-07-23 RX ORDER — LORATADINE 10 MG/1
10 TABLET ORAL DAILY
Qty: 15 TABLET | Refills: 0 | Status: SHIPPED | OUTPATIENT
Start: 2024-07-23

## 2024-07-23 ASSESSMENT — PATIENT HEALTH QUESTIONNAIRE - PHQ9
SUM OF ALL RESPONSES TO PHQ QUESTIONS 1-9: 0
SUM OF ALL RESPONSES TO PHQ QUESTIONS 1-9: 0
10. IF YOU CHECKED OFF ANY PROBLEMS, HOW DIFFICULT HAVE THESE PROBLEMS MADE IT FOR YOU TO DO YOUR WORK, TAKE CARE OF THINGS AT HOME, OR GET ALONG WITH OTHER PEOPLE: NOT DIFFICULT AT ALL

## 2024-07-23 ASSESSMENT — PAIN SCALES - GENERAL: PAINLEVEL: MODERATE PAIN (5)

## 2024-07-23 NOTE — PATIENT INSTRUCTIONS
"I suspect symptoms are viral or allergic at this time given onset being yesterday. Bacterial infections often follow viral infections. I would recommend symptom management with daily nasal Flonase, allergy medication, and sterile saline rinses. I have sent a prescription for the Flonase and daily claritin. Please take as directed. Continue with as needed acetaminophen (Tylenol) and ibuprofen (Advil) for pain relief. You may find benefit in applying a warm compress to the face for further symptom relief. The sore throat is likely related to the post-nasal drip. The topical nasal Flonase will help reduce the leaking occurring in the sinus tissue, see directions for use below. If symptoms persist into next week and discharge turns thick and green/yellow please follow-up.     - Nasal Corticosteroids: I recommend using a nasal corticosteroid spray, such as Flonase or Nasacort, to reduce inflammation in your nasal passages and improve breathing and drainage.  Use the nasal spray once daily (1-2 sprays into each nostril).   It is best to do a saline rinse just prior to using the nasal spray.  Shake and \"prime\" the bottle first making sure a nice spray comes out prior to use.  Aim back into the sinuses, not just straight up your nose. Also aim a little away from the center (septum) of your nose.   Breath in slightly (but not excessively) with each spray. When completed breath out through the mouth  Repeat on the other side.  Wipe of the nozzle with a clean tissue when complete and cover with the manufactures cap.    Store in a room temperature area out of the light.  Don't share sprays with friends and family.    A side effect of most nasal sprays includes nose bleeds.  Be patient as it may take a few days to start working.             "

## 2024-07-23 NOTE — PROGRESS NOTES
Assessment & Plan  1. Dysfunction of both eustachian tubes  2. Post-nasal drip   3. Rhinorrhea  Patient is a 38 year-old female without significant past medical history presenting with sinus symptoms for the past 24 hours. Suspect viral or allergic etiology. Recommend symptom management including topical nasal corticosteroid, 2nd generation antihistamine, and sterile saline rinses. Prescribed as below. Discussed proper use of medication(s) and potential side effects. Follow-up if symptoms fail to improve despite above interventions. Patient understands and is agreeable to plan as discussed in clinic.  - fluticasone (FLONASE) 50 MCG/ACT nasal spray; Spray 1 spray into both nostrils daily  Dispense: 9.9 mL; Refill: 0  - loratadine (CLARITIN) 10 MG tablet; Take 1 tablet (10 mg) by mouth daily  Dispense: 15 tablet; Refill: 0    Yesica lee is a 38 year old, presenting for the following health issues:  Sinus Problem      7/23/2024     3:05 PM   Additional Questions   Roomed by Meghan RANGEL     History of Present Illness       Reason for visit:  Sinus problem  Symptom onset:  1-3 days ago  Symptoms include:  Sinus pressure, congestion, nasal drainage, runny nose/ clear, ear pain bilateral  Symptom intensity:  Moderate  Symptom progression:  Worsening  Had these symptoms before:  Yes  Has tried/received treatment for these symptoms:  Yes  Previous treatment was successful:  No  What makes it worse:  None  What makes it better:  Ibuprofen    She eats 4 or more servings of fruits and vegetables daily.She consumes 0 sweetened beverage(s) daily.She exercises with enough effort to increase her heart rate 20 to 29 minutes per day.  She exercises with enough effort to increase her heart rate 3 or less days per week.   She is taking medications regularly.    Presents with concerns of rhinorrhea, nasal congestion, post-nasal drip, and maxillary sinus pressure for the past 24 hours. Associated sore throat, using cough drops as  "needed which has soothed the throat. States that she had a temperature of 100 this morning. Has been taking as needed ibuprofen for pain with relief. Denies history of allergies. Not taking allergy medication or topical nasal corticosteroids.     Denies cough, dyspnea, or orthopnea.       Objective    /80   Pulse 97   Temp 98.6  F (37  C) (Temporal)   Resp 20   Ht 1.61 m (5' 3.39\")   Wt 67.6 kg (149 lb)   LMP 07/02/2024   SpO2 97%   BMI 26.07 kg/m    Body mass index is 26.07 kg/m .  Physical Exam  Vitals reviewed.   Constitutional:       General: She is not in acute distress.     Appearance: Normal appearance. She is not ill-appearing.   HENT:      Head: Normocephalic and atraumatic.      Right Ear: Tympanic membrane, ear canal and external ear normal.      Left Ear: Tympanic membrane, ear canal and external ear normal.      Ears:      Comments: Negative for middle ear effusion, TM injection, bulging, and erythema bilaterally.     Nose: Congestion and rhinorrhea present. Rhinorrhea is purulent.      Right Turbinates: Enlarged.      Left Turbinates: Enlarged.      Right Sinus: No maxillary sinus tenderness or frontal sinus tenderness.      Left Sinus: No maxillary sinus tenderness or frontal sinus tenderness.      Mouth/Throat:      Pharynx: Oropharynx is clear. Posterior oropharyngeal erythema present. No oropharyngeal exudate.   Eyes:      Conjunctiva/sclera: Conjunctivae normal.      Right eye: Right conjunctiva is not injected. No exudate.     Left eye: Left conjunctiva is not injected. No exudate.     Pupils: Pupils are equal, round, and reactive to light.   Cardiovascular:      Rate and Rhythm: Normal rate and regular rhythm.      Heart sounds: Normal heart sounds, S1 normal and S2 normal. No murmur heard.  Pulmonary:      Effort: Pulmonary effort is normal.      Breath sounds: Normal breath sounds. No wheezing.      Comments: Negative for adventitious breath sounds in all lung " fields.  Lymphadenopathy:      Cervical: No cervical adenopathy.   Skin:     General: Skin is warm and dry.      Capillary Refill: Capillary refill takes less than 2 seconds.      Findings: No lesion or rash.   Neurological:      Mental Status: She is alert.   Psychiatric:         Attention and Perception: Attention and perception normal.         Mood and Affect: Mood and affect normal.         Signed Electronically by: FRANCISCO Faustin CNP

## 2025-01-15 ENCOUNTER — PATIENT OUTREACH (OUTPATIENT)
Dept: ONCOLOGY | Facility: CLINIC | Age: 40
End: 2025-01-15

## 2025-01-15 ENCOUNTER — OFFICE VISIT (OUTPATIENT)
Dept: FAMILY MEDICINE | Facility: OTHER | Age: 40
End: 2025-01-15
Payer: COMMERCIAL

## 2025-01-15 VITALS
DIASTOLIC BLOOD PRESSURE: 83 MMHG | BODY MASS INDEX: 26.58 KG/M2 | TEMPERATURE: 99 F | OXYGEN SATURATION: 100 % | RESPIRATION RATE: 11 BRPM | SYSTOLIC BLOOD PRESSURE: 124 MMHG | HEART RATE: 74 BPM | HEIGHT: 63 IN | WEIGHT: 150 LBS

## 2025-01-15 DIAGNOSIS — Z12.11 SCREEN FOR COLON CANCER: ICD-10-CM

## 2025-01-15 DIAGNOSIS — Z80.0 FAMILY HISTORY OF COLON CANCER: ICD-10-CM

## 2025-01-15 DIAGNOSIS — Z13.6 ENCOUNTER FOR LIPID SCREENING FOR CARDIOVASCULAR DISEASE: ICD-10-CM

## 2025-01-15 DIAGNOSIS — N28.1 RENAL CYST: ICD-10-CM

## 2025-01-15 DIAGNOSIS — Z00.00 ROUTINE GENERAL MEDICAL EXAMINATION AT A HEALTH CARE FACILITY: Primary | ICD-10-CM

## 2025-01-15 DIAGNOSIS — Z13.220 ENCOUNTER FOR LIPID SCREENING FOR CARDIOVASCULAR DISEASE: ICD-10-CM

## 2025-01-15 LAB
ANION GAP SERPL CALCULATED.3IONS-SCNC: 9 MMOL/L (ref 7–15)
BUN SERPL-MCNC: 12.2 MG/DL (ref 6–20)
CALCIUM SERPL-MCNC: 9.1 MG/DL (ref 8.8–10.4)
CHLORIDE SERPL-SCNC: 105 MMOL/L (ref 98–107)
CHOLEST SERPL-MCNC: 179 MG/DL
CREAT SERPL-MCNC: 0.74 MG/DL (ref 0.51–0.95)
EGFRCR SERPLBLD CKD-EPI 2021: >90 ML/MIN/1.73M2
FASTING STATUS PATIENT QL REPORTED: YES
FASTING STATUS PATIENT QL REPORTED: YES
GLUCOSE SERPL-MCNC: 100 MG/DL (ref 70–99)
HBV SURFACE AB SERPL IA-ACNC: <3.5 M[IU]/ML
HBV SURFACE AB SERPL IA-ACNC: NONREACTIVE M[IU]/ML
HCO3 SERPL-SCNC: 26 MMOL/L (ref 22–29)
HDLC SERPL-MCNC: 51 MG/DL
LDLC SERPL CALC-MCNC: 102 MG/DL
NONHDLC SERPL-MCNC: 128 MG/DL
POTASSIUM SERPL-SCNC: 4 MMOL/L (ref 3.4–5.3)
SODIUM SERPL-SCNC: 140 MMOL/L (ref 135–145)
TRIGL SERPL-MCNC: 129 MG/DL

## 2025-01-15 PROCEDURE — 86706 HEP B SURFACE ANTIBODY: CPT | Performed by: FAMILY MEDICINE

## 2025-01-15 PROCEDURE — 80061 LIPID PANEL: CPT | Performed by: FAMILY MEDICINE

## 2025-01-15 PROCEDURE — 99395 PREV VISIT EST AGE 18-39: CPT | Performed by: FAMILY MEDICINE

## 2025-01-15 PROCEDURE — 36415 COLL VENOUS BLD VENIPUNCTURE: CPT | Performed by: FAMILY MEDICINE

## 2025-01-15 PROCEDURE — 99213 OFFICE O/P EST LOW 20 MIN: CPT | Mod: 25 | Performed by: FAMILY MEDICINE

## 2025-01-15 PROCEDURE — 80048 BASIC METABOLIC PNL TOTAL CA: CPT | Performed by: FAMILY MEDICINE

## 2025-01-15 SDOH — HEALTH STABILITY: PHYSICAL HEALTH: ON AVERAGE, HOW MANY MINUTES DO YOU ENGAGE IN EXERCISE AT THIS LEVEL?: 60 MIN

## 2025-01-15 SDOH — HEALTH STABILITY: PHYSICAL HEALTH: ON AVERAGE, HOW MANY DAYS PER WEEK DO YOU ENGAGE IN MODERATE TO STRENUOUS EXERCISE (LIKE A BRISK WALK)?: 2 DAYS

## 2025-01-15 ASSESSMENT — PATIENT HEALTH QUESTIONNAIRE - PHQ9
10. IF YOU CHECKED OFF ANY PROBLEMS, HOW DIFFICULT HAVE THESE PROBLEMS MADE IT FOR YOU TO DO YOUR WORK, TAKE CARE OF THINGS AT HOME, OR GET ALONG WITH OTHER PEOPLE: SOMEWHAT DIFFICULT
SUM OF ALL RESPONSES TO PHQ QUESTIONS 1-9: 2
SUM OF ALL RESPONSES TO PHQ QUESTIONS 1-9: 2

## 2025-01-15 ASSESSMENT — SOCIAL DETERMINANTS OF HEALTH (SDOH): HOW OFTEN DO YOU GET TOGETHER WITH FRIENDS OR RELATIVES?: ONCE A WEEK

## 2025-01-15 ASSESSMENT — PAIN SCALES - GENERAL: PAINLEVEL_OUTOF10: NO PAIN (0)

## 2025-01-15 NOTE — PROGRESS NOTES
"Preventive Care Visit  [facility]  Alexia Churchill MD, MD, Family Medicine  [place] 15, 2025      Assessment & Plan         ICD-10-CM    1. Routine general medical examination at a health care facility  Z00.00 Hepatitis B Surface Antibody     Hepatitis B Surface Antibody      2. Family history of colon cancer  Z80.0 Adult Genetics & Metabolism  Referral      3. Renal cyst  N28.1 Basic metabolic panel  (Ca, Cl, CO2, Creat, Gluc, K, Na, BUN)      Renal Complete Non-Vascular     Basic metabolic panel  (Ca, Cl, CO2, Creat, Gluc, K, Na, BUN)      4. Encounter for lipid screening for cardiovascular disease  Z13.220 Lipid panel reflex to direct LDL Fasting    Z13.6 Basic metabolic panel  (Ca, Cl, CO2, Creat, Gluc, K, Na, BUN)     Lipid panel reflex to direct LDL Fasting     Basic metabolic panel  (Ca, Cl, CO2, Creat, Gluc, K, Na, BUN)      5. Screen for colon cancer  Z12.11 Colonoscopy Screening  Referral          Had dull general ache on R flank area at times. Will check on the previously found cyst as this appears likely muscular, but will want to know if this is changing.   Discussed family history of colon cancer with mother at 40 dying from colon cancer. Mother did not get genetic testing that she is aware of. Maternal aunt with breast cancer as well. Discussed option for genetic testing and she is interested. Is due for colonoscopy again and order placed, but had big expense last time, so will talk to her insurance to figure out if there is a better way to get coverage.      No LOS data to display   Time spent by me today doing chart review, history and exam, documentation and further activities per the note    Alexia Churchill MD     Patient has been advised of split billing requirements and indicates understanding: Yes        BMI  Estimated body mass index is 26.58 kg/m  as calculated from the following:    Height as of this encounter: 1.6 m (5' 2.99\").    Weight as of this encounter: " 68 kg (150 lb).   Weight management plan: Discussed healthy diet and exercise guidelines    Counseling  Appropriate preventive services were addressed with this patient via screening, questionnaire, or discussion as appropriate for fall prevention, nutrition, physical activity, Tobacco-use cessation, social engagement, weight loss and cognition.  Checklist reviewing preventive services available has been given to the patient.  Reviewed patient's diet, addressing concerns and/or questions.   She is at risk for lack of exercise and has been provided with information to increase physical activity for the benefit of her well-being.   She is at risk for psychosocial distress and has been provided with information to reduce risk.           Yesica lee is a 39 year old, presenting for the following:  Physical        1/15/2025     8:53 AM   Additional Questions   Roomed by ritesh   Accompanied by self          HPI  Check Glucose levels and Heart palpations    Had monitor and had PACs during times of symptoms in past.Now noticing pattern that heart palpitaitons are worse with eating.    Health Care Directive  Patient does not have a Health Care Directive: Discussed advance care planning with patient; information given to patient to review.      1/15/2025   General Health   How would you rate your overall physical health? Good   Feel stress (tense, anxious, or unable to sleep) To some extent   (!) STRESS CONCERN      1/15/2025   Nutrition   Three or more servings of calcium each day? Yes   Diet: Low salt    Low fat/cholesterol    Gluten-free/reduced   How many servings of fruit and vegetables per day? (!) 2-3   How many sweetened beverages each day? 0-1       Multiple values from one day are sorted in reverse-chronological order         1/15/2025   Exercise   Days per week of moderate/strenous exercise 2 days   Average minutes spent exercising at this level 60 min   (!) EXERCISE CONCERN      1/15/2025   Social Factors    Frequency of gathering with friends or relatives Once a week   Worry food won't last until get money to buy more No   Food not last or not have enough money for food? No   Do you have housing? (Housing is defined as stable permanent housing and does not include staying ouside in a car, in a tent, in an abandoned building, in an overnight shelter, or couch-surfing.) Yes   Are you worried about losing your housing? No   Lack of transportation? No   Unable to get utilities (heat,electricity)? No         1/15/2025   Dental   Dentist two times every year? Yes         1/15/2025   TB Screening   Were you born outside of the [place]? No       Today's PHQ-9 Score:       1/15/2025     8:43 AM   PHQ-9 SCORE   PHQ-9 Total Score MyChart 2 (Minimal depression)   PHQ-9 Total Score 2        Patient-reported         1/15/2025   Substance Use   Alcohol more than 3/day or more than 7/wk No   Do you use any other substances recreationally? No     Social History     Tobacco Use    Smoking status: Never    Smokeless tobacco: Never   Vaping Use    Vaping status: Never Used   Substance Use Topics    Alcohol use: Yes     Alcohol/week: 7.0 standard drinks of alcohol     Types: 7 Glasses of wine per week     Comment: sometimes    Drug use: No           11/16/2023   LAST FHS-7 RESULTS   1st degree relative breast or ovarian cancer No   Any relative bilateral breast cancer No   Any male have breast cancer No   Any ONE woman have BOTH breast AND ovarian cancer Yes   Any woman with breast cancer before 50yrs No   2 or more relatives with breast AND/OR ovarian cancer Yes   2 or more relatives with breast AND/OR bowel cancer Yes        Mammogram Screening - Patient under 40 years of age: Routine Mammogram Screening not recommended.         1/15/2025   STI Screening   New sexual partner(s) since last STI/HIV test? No     History of abnormal Pap smear: No - age 30- 64 PAP with HPV every 5 years recommended        Latest Ref Rng & Units 1/5/2021     9:50  "AM 1/5/2021     9:30 AM 3/21/2019     4:17 PM   PAP / HPV   PAP (Historical)  NIL   NIL    HPV 16 DNA NEG^Negative  Negative     HPV 18 DNA NEG^Negative  Negative     Other HR HPV NEG^Negative  Negative            Reviewed and updated as needed this visit by Provider   Tobacco  Allergies  Meds  Problems  Med Hx  Surg Hx  Fam Hx                  Review of Systems  Constitutional, HEENT, cardiovascular, pulmonary, GI, , musculoskeletal, neuro, skin, endocrine and psych systems are negative, except as otherwise noted.     Objective    Exam  /83   Pulse 74   Temp 99  F (37.2  C) (Temporal)   Resp 11   Ht 1.6 m (5' 2.99\")   Wt 68 kg (150 lb)   LMP 01/05/2025 (Exact Date)   SpO2 100%   BMI 26.58 kg/m     Estimated body mass index is 26.58 kg/m  as calculated from the following:    Height as of this encounter: 1.6 m (5' 2.99\").    Weight as of this encounter: 68 kg (150 lb).    Physical Exam  GENERAL: alert and no distress  RESP: lungs clear to auscultation - no rales, rhonchi or wheezes  BREAST: normal without masses, tenderness or nipple discharge and no palpable axillary masses or adenopathy  CV: regular rate and rhythm, normal S1 S2, no S3 or S4, no murmur, click or rub, no peripheral edema  ABDOMEN: soft, nontender, no hepatosplenomegaly, no masses and bowel sounds normal  MS: no gross musculoskeletal defects noted, no edema  SKIN: no suspicious lesions or rashes  NEURO: Normal strength and tone, mentation intact and speech normal  PSYCH: mentation appears normal, affect normal/bright        Signed Electronically by: Alexia Churchill MD, MD    Answers submitted by the patient for this visit:  Patient Health Questionnaire (Submitted on 1/15/2025)  If you checked off any problems, how difficult have these problems made it for you to do your work, take care of things at home, or get along with other people?: Somewhat difficult  PHQ9 TOTAL SCORE: 2    "

## 2025-01-15 NOTE — PROGRESS NOTES
New Patient Oncology Nurse Navigator Note     Referring provider:     Alexia Churchill MD        Referring Clinic/Organization: [facility]      Referred to (specialty:) Genetic Counseling and Cancer Risk Management     Requested provider (if applicable): NA     Date Referral Received: January 15, 2025     Evaluation for:  Z80.0 (ICD-10-CM) - Family history of colon cancer     Payor: [facility] / Plan: [facility] CHOICE / Product Type: Indemnity /     January 15, 2025  Referral received and reviewed.   Sent to NPS to schedule.    Rakel BRENNANN, RN, OCN  Oncology Nurse Navigator   [facility]  Cancer Care Service Line   New Patient Hem/Onc Scheduling / Referrals: [phone] (fax: [phone] )

## 2025-01-15 NOTE — PATIENT INSTRUCTIONS
Patient Education   Preventive Care Advice   This is general advice given by our system to help you stay healthy. However, your care team may have specific advice just for you. Please talk to your care team about your preventive care needs.  Nutrition  Eat 5 or more servings of fruits and vegetables each day.  Try wheat bread, brown rice and whole grain pasta (instead of white bread, rice, and pasta).  Get enough calcium and vitamin D. Check the label on foods and aim for 100% of the RDA (recommended daily allowance).  Lifestyle  Exercise at least 150 minutes each week  (30 minutes a day, 5 days a week).  Do muscle strengthening activities 2 days a week. These help control your weight and prevent disease.  No smoking.  Wear sunscreen to prevent skin cancer.  Have a dental exam and cleaning every 6 months.  Yearly exams  See your health care team every year to talk about:  Any changes in your health.  Any medicines your care team has prescribed.  Preventive care, family planning, and ways to prevent chronic diseases.  Shots (vaccines)   HPV shots (up to age 26), if you've never had them before.  Hepatitis B shots (up to age 59), if you've never had them before.  COVID-19 shot: Get this shot when it's due.  Flu shot: Get a flu shot every year.  Tetanus shot: Get a tetanus shot every 10 years.  Pneumococcal, hepatitis A, and RSV shots: Ask your care team if you need these based on your risk.  Shingles shot (for age 50 and up)  General health tests  Diabetes screening:  Starting at age 35, Get screened for diabetes at least every 3 years.  If you are younger than age 35, ask your care team if you should be screened for diabetes.  Cholesterol test: At age 39, start having a cholesterol test every 5 years, or more often if advised.  Bone density scan (DEXA): At age 50, ask your care team if you should have this scan for osteoporosis (brittle bones).  Hepatitis C: Get tested at least once in your life.  STIs (sexually  transmitted infections)  Before age 24: Ask your care team if you should be screened for STIs.  After age 24: Get screened for STIs if you're at risk. You are at risk for STIs (including HIV) if:  You are sexually active with more than one person.  You don't use condoms every time.  You or a partner was diagnosed with a sexually transmitted infection.  If you are at risk for HIV, ask about PrEP medicine to prevent HIV.  Get tested for HIV at least once in your life, whether you are at risk for HIV or not.  Cancer screening tests  Cervical cancer screening: If you have a cervix, begin getting regular cervical cancer screening tests starting at age 21.  Breast cancer scan (mammogram): If you've ever had breasts, begin having regular mammograms starting at age 40. This is a scan to check for breast cancer.  Colon cancer screening: It is important to start screening for colon cancer at age 45.  Have a colonoscopy test every 10 years (or more often if you're at risk) Or, ask your provider about stool tests like a FIT test every year or Cologuard test every 3 years.  To learn more about your testing options, visit:   .  For help making a decision, visit:   https://bit.ly/fx06775.  Prostate cancer screening test: If you have a prostate, ask your care team if a prostate cancer screening test (PSA) at age 55 is right for you.  Lung cancer screening: If you are a current or former smoker ages 50 to 80, ask your care team if ongoing lung cancer screenings are right for you.  For informational purposes only. Not to replace the advice of your health care provider. Copyright   2023 [facility]. All rights reserved. Clinically reviewed by the [facility]. [employer] [id number] - REV 01/24.  Learning About Stress  What is stress?     Stress is your body's response to a hard situation. Your body can have a physical, emotional, or mental response. Stress is a fact of life for most people, and it  affects everyone differently. What causes stress for you may not be stressful for someone else.  A lot of things can cause stress. You may feel stress when you go on a job interview, take a test, or run a race. This kind of short-term stress is normal and even useful. It can help you if you need to work hard or react quickly. For example, stress can help you finish an important job on time.  Long-term stress is caused by ongoing stressful situations or events. Examples of long-term stress include long-term health problems, ongoing problems at work, or conflicts in your family. Long-term stress can harm your health.  How does stress affect your health?  When you are stressed, your body responds as though you are in danger. It makes hormones that speed up your heart, make you breathe faster, and give you a burst of energy. This is called the fight-or-flight stress response. If the stress is over quickly, your body goes back to normal and no harm is done.  But if stress happens too often or lasts too long, it can have bad effects. Long-term stress can make you more likely to get sick, and it can make symptoms of some diseases worse. If you tense up when you are stressed, you may develop neck, shoulder, or low back pain. Stress is linked to high blood pressure and heart disease.  Stress also harms your emotional health. It can make you coronado, tense, or depressed. Your relationships may suffer, and you may not do well at work or school.  What can you do to manage stress?  You can try these things to help manage stress:   Do something active. Exercise or activity can help reduce stress. Walking is a great way to get started. Even everyday activities such as housecleaning or yard work can help.  Try yoga or amy chi. These techniques combine exercise and meditation. You may need some training at first to learn them.  Do something you enjoy. For example, listen to music or go to a movie. Practice your hobby or do volunteer  "work.  Meditate. This can help you relax, because you are not worrying about what happened before or what may happen in the future.  Do guided imagery. Imagine yourself in any setting that helps you feel calm. You can use online videos, books, or a teacher to guide you.  Do breathing exercises. For example:  From a standing position, bend forward from the waist with your knees slightly bent. Let your arms dangle close to the floor.  Breathe in slowly and deeply as you return to a standing position. Roll up slowly and lift your head last.  Hold your breath for just a few seconds in the standing position.  Breathe out slowly and bend forward from the waist.  Let your feelings out. Talk, laugh, cry, and express anger when you need to. Talking with supportive friends or family, a counselor, or a isma leader about your feelings is a healthy way to relieve stress. Avoid discussing your feelings with people who make you feel worse.  Write. It may help to write about things that are bothering you. This helps you find out how much stress you feel and what is causing it. When you know this, you can find better ways to cope.  What can you do to prevent stress?  You might try some of these things to help prevent stress:  Manage your time. This helps you find time to do the things you want and need to do.  Get enough sleep. Your body recovers from the stresses of the day while you are sleeping.  Get support. Your family, friends, and community can make a difference in how you experience stress.  Limit your news feed. Avoid or limit time on social media or news that may make you feel stressed.  Do something active. Exercise or activity can help reduce stress. Walking is a great way to get started.  Where can you learn more?  Go to https://www.[employer].net/patiented  Enter N032 in the search box to learn more about \"Learning About Stress.\"  Current as of: October 24, 2023  Content Version: 14.3    2024 [facility]. "   Care instructions adapted under license by your healthcare professional. If you have questions about a medical condition or this instruction, always ask your healthcare professional. [employer] disclaims any warranty or liability for your use of this information.

## 2025-02-12 ENCOUNTER — ANCILLARY PROCEDURE (OUTPATIENT)
Dept: ULTRASOUND IMAGING | Facility: OTHER | Age: 40
End: 2025-02-12
Attending: FAMILY MEDICINE
Payer: COMMERCIAL

## 2025-02-12 DIAGNOSIS — N28.1 RENAL CYST: ICD-10-CM

## 2025-02-12 PROCEDURE — 76770 US EXAM ABDO BACK WALL COMP: CPT | Mod: TC | Performed by: INTERNAL MEDICINE

## 2025-03-06 ENCOUNTER — MYC MEDICAL ADVICE (OUTPATIENT)
Dept: FAMILY MEDICINE | Facility: OTHER | Age: 40
End: 2025-03-06
Payer: COMMERCIAL

## 2025-03-07 ENCOUNTER — HOSPITAL ENCOUNTER (OUTPATIENT)
Dept: CT IMAGING | Facility: CLINIC | Age: 40
Discharge: HOME | End: 2025-03-07
Attending: FAMILY MEDICINE | Admitting: FAMILY MEDICINE
Payer: COMMERCIAL

## 2025-03-07 DIAGNOSIS — N13.30 HYDRONEPHROSIS OF LEFT KIDNEY: ICD-10-CM

## 2025-03-07 DIAGNOSIS — Z82.71 FAMILY HISTORY OF POLYCYSTIC KIDNEY DISEASE: ICD-10-CM

## 2025-03-07 PROCEDURE — 74176 CT ABD & PELVIS W/O CONTRAST: CPT

## 2025-08-12 ENCOUNTER — PATIENT OUTREACH (OUTPATIENT)
Dept: CARE COORDINATION | Facility: CLINIC | Age: 40
End: 2025-08-12
Payer: COMMERCIAL

## 2025-09-04 ENCOUNTER — TELEPHONE (OUTPATIENT)
Dept: FAMILY MEDICINE | Facility: OTHER | Age: 40
End: 2025-09-04
Payer: COMMERCIAL

## (undated) RX ORDER — FENTANYL CITRATE 50 UG/ML
INJECTION, SOLUTION INTRAMUSCULAR; INTRAVENOUS
Status: DISPENSED
Start: 2019-05-16